# Patient Record
Sex: FEMALE | Race: WHITE
[De-identification: names, ages, dates, MRNs, and addresses within clinical notes are randomized per-mention and may not be internally consistent; named-entity substitution may affect disease eponyms.]

---

## 2021-11-04 ENCOUNTER — HOSPITAL ENCOUNTER (EMERGENCY)
Dept: HOSPITAL 49 - FER | Age: 51
Discharge: HOME | End: 2021-11-04
Payer: COMMERCIAL

## 2021-11-04 DIAGNOSIS — I10: ICD-10-CM

## 2021-11-04 DIAGNOSIS — R20.2: ICD-10-CM

## 2021-11-04 DIAGNOSIS — F17.210: ICD-10-CM

## 2021-11-04 DIAGNOSIS — R07.89: Primary | ICD-10-CM

## 2021-11-04 LAB
ALBUMIN SERPL-MCNC: 3.9 G/DL (ref 3.4–5)
ALKALINE PHOSHATASE: 61 U/L (ref 46–116)
ALT SERPL-CCNC: 21 U/L (ref 14–59)
AST: 16 U/L (ref 15–37)
BASOPHIL: 0.5 % (ref 0–2)
BILIRUBIN - TOTAL: 0.2 MG/DL (ref 0.2–1)
BUN SERPL-MCNC: 17 MG/DL (ref 7–18)
BUN/CREAT RATIO (CALC): 17.9 RATIO
CHLORIDE: 106 MMOL/L (ref 98–107)
CO2 (BICARBONATE): 25 MMOL/L (ref 21–32)
CREATININE: 0.95 MG/DL (ref 0.51–0.95)
EOSINOPHIL: 3.1 % (ref 0–5)
GLOBULIN (CALCULATION): 3.9 G/DL
GLUCOSE SERPL-MCNC: 80 MG/DL (ref 74–106)
HCT: 41.3 % (ref 37–47)
HGB BLD-MCNC: 13.4 G/DL (ref 12.5–16)
INR PPP: 1.07 (ref 0.9–1.2)
LYMPHOCYTE: 26.8 % (ref 15–48)
MCH RBC QN AUTO: 30.4 PG (ref 25–31)
MCHC RBC AUTO-ENTMCNC: 32.4 G/DL (ref 32–36)
MCV: 93.7 FL (ref 78–100)
MONOCYTE: 5 % (ref 0–12)
MPV: 9.7 FL (ref 6–9.5)
NEUTROPHIL: 64.5 % (ref 41–80)
NRBC: 0
PLT: 258 K/UL (ref 150–400)
POTASSIUM: 3.7 MMOL/L (ref 3.5–5.1)
PROTHROMBIN TIME: 13.3 SECONDS (ref 11.8–13.4)
PTT: 30.3 SECONDS (ref 24.4–34.7)
RBC MORPHOLOGY: NORMAL
RBC: 4.41 M/UL (ref 4.2–5.4)
RDW: 12.6 % (ref 11.5–14)
TOTAL PROTEIN: 7.8 G/DL (ref 6.4–8.2)
WBC: 8.1 K/UL (ref 4–10.5)

## 2022-01-19 PROCEDURE — U0004 COV-19 TEST NON-CDC HGH THRU: HCPCS | Performed by: FAMILY MEDICINE

## 2022-03-15 ENCOUNTER — HOSPITAL ENCOUNTER (INPATIENT)
Facility: HOSPITAL | Age: 52
LOS: 2 days | Discharge: HOME | End: 2022-03-18
Attending: EMERGENCY MEDICINE | Admitting: INTERNAL MEDICINE

## 2022-03-15 DIAGNOSIS — R41.82 ALTERED MENTAL STATUS, UNSPECIFIED ALTERED MENTAL STATUS TYPE: ICD-10-CM

## 2022-03-15 DIAGNOSIS — G25.71 DRUG-INDUCED AKATHISIA: Primary | ICD-10-CM

## 2022-03-15 DIAGNOSIS — E86.0 DEHYDRATION: ICD-10-CM

## 2022-03-15 DIAGNOSIS — N17.9 ACUTE RENAL FAILURE, UNSPECIFIED ACUTE RENAL FAILURE TYPE: ICD-10-CM

## 2022-03-15 DIAGNOSIS — F15.10 METHAMPHETAMINE ABUSE: ICD-10-CM

## 2022-03-15 DIAGNOSIS — M62.82 NON-TRAUMATIC RHABDOMYOLYSIS: ICD-10-CM

## 2022-03-15 LAB
ALBUMIN SERPL-MCNC: 4.9 G/DL (ref 3.5–5.2)
ALBUMIN/GLOB SERPL: 1.3 G/DL
ALP SERPL-CCNC: 92 U/L (ref 39–117)
ALT SERPL W P-5'-P-CCNC: 197 U/L (ref 1–33)
AMPHET+METHAMPHET UR QL: POSITIVE
ANION GAP SERPL CALCULATED.3IONS-SCNC: 21.6 MMOL/L (ref 5–15)
APAP SERPL-MCNC: <5 MCG/ML (ref 0–30)
AST SERPL-CCNC: 885 U/L (ref 1–32)
BARBITURATES UR QL SCN: NEGATIVE
BASOPHILS # BLD AUTO: 0.06 10*3/MM3 (ref 0–0.2)
BASOPHILS NFR BLD AUTO: 0.2 % (ref 0–1.5)
BENZODIAZ UR QL SCN: NEGATIVE
BILIRUB SERPL-MCNC: 0.9 MG/DL (ref 0–1.2)
BUN SERPL-MCNC: 49 MG/DL (ref 6–20)
BUN/CREAT SERPL: 14.5 (ref 7–25)
CALCIUM SPEC-SCNC: 10.4 MG/DL (ref 8.6–10.5)
CANNABINOIDS SERPL QL: NEGATIVE
CHLORIDE SERPL-SCNC: 94 MMOL/L (ref 98–107)
CK SERPL-CCNC: ABNORMAL U/L (ref 20–180)
CO2 SERPL-SCNC: 20.4 MMOL/L (ref 22–29)
COCAINE UR QL: NEGATIVE
CREAT SERPL-MCNC: 3.37 MG/DL (ref 0.57–1)
DEPRECATED RDW RBC AUTO: 42.4 FL (ref 37–54)
EGFRCR SERPLBLD CKD-EPI 2021: 16.1 ML/MIN/1.73
EOSINOPHIL # BLD AUTO: 0 10*3/MM3 (ref 0–0.4)
EOSINOPHIL NFR BLD AUTO: 0 % (ref 0.3–6.2)
ERYTHROCYTE [DISTWIDTH] IN BLOOD BY AUTOMATED COUNT: 13.2 % (ref 12.3–15.4)
ETHANOL BLD-MCNC: <10 MG/DL (ref 0–10)
ETHANOL UR QL: <0.01 %
GLOBULIN UR ELPH-MCNC: 3.8 GM/DL
GLUCOSE SERPL-MCNC: 120 MG/DL (ref 65–99)
HCT VFR BLD AUTO: 41.1 % (ref 34–46.6)
HGB BLD-MCNC: 14.1 G/DL (ref 12–15.9)
HOLD SPECIMEN: NORMAL
HOLD SPECIMEN: NORMAL
IMM GRANULOCYTES # BLD AUTO: 0.28 10*3/MM3 (ref 0–0.05)
IMM GRANULOCYTES NFR BLD AUTO: 0.8 % (ref 0–0.5)
LARGE PLATELETS: NORMAL
LYMPHOCYTES # BLD AUTO: 2.74 10*3/MM3 (ref 0.7–3.1)
LYMPHOCYTES NFR BLD AUTO: 7.4 % (ref 19.6–45.3)
MCH RBC QN AUTO: 30 PG (ref 26.6–33)
MCHC RBC AUTO-ENTMCNC: 34.3 G/DL (ref 31.5–35.7)
MCV RBC AUTO: 87.4 FL (ref 79–97)
METHADONE UR QL SCN: NEGATIVE
MONOCYTES # BLD AUTO: 2.37 10*3/MM3 (ref 0.1–0.9)
MONOCYTES NFR BLD AUTO: 6.4 % (ref 5–12)
NEUTROPHILS NFR BLD AUTO: 31.37 10*3/MM3 (ref 1.7–7)
NEUTROPHILS NFR BLD AUTO: 85.2 % (ref 42.7–76)
NRBC BLD AUTO-RTO: 0 /100 WBC (ref 0–0.2)
OPIATES UR QL: NEGATIVE
OXYCODONE UR QL SCN: NEGATIVE
PLATELET # BLD AUTO: 491 10*3/MM3 (ref 140–450)
PMV BLD AUTO: 9.7 FL (ref 6–12)
POTASSIUM SERPL-SCNC: 5.4 MMOL/L (ref 3.5–5.2)
PROT SERPL-MCNC: 8.7 G/DL (ref 6–8.5)
RBC # BLD AUTO: 4.7 10*6/MM3 (ref 3.77–5.28)
RBC MORPH BLD: NORMAL
SALICYLATES SERPL-MCNC: <0.3 MG/DL
SODIUM SERPL-SCNC: 136 MMOL/L (ref 136–145)
WBC MORPH BLD: NORMAL
WBC NRBC COR # BLD: 36.82 10*3/MM3 (ref 3.4–10.8)
WHOLE BLOOD HOLD SPECIMEN: NORMAL
WHOLE BLOOD HOLD SPECIMEN: NORMAL

## 2022-03-15 PROCEDURE — 80053 COMPREHEN METABOLIC PANEL: CPT | Performed by: EMERGENCY MEDICINE

## 2022-03-15 PROCEDURE — 99285 EMERGENCY DEPT VISIT HI MDM: CPT

## 2022-03-15 PROCEDURE — 87086 URINE CULTURE/COLONY COUNT: CPT | Performed by: PHYSICIAN ASSISTANT

## 2022-03-15 PROCEDURE — 80307 DRUG TEST PRSMV CHEM ANLYZR: CPT | Performed by: EMERGENCY MEDICINE

## 2022-03-15 PROCEDURE — 80143 DRUG ASSAY ACETAMINOPHEN: CPT | Performed by: EMERGENCY MEDICINE

## 2022-03-15 PROCEDURE — 25010000002 LORAZEPAM PER 2 MG: Performed by: EMERGENCY MEDICINE

## 2022-03-15 PROCEDURE — 85025 COMPLETE CBC W/AUTO DIFF WBC: CPT | Performed by: EMERGENCY MEDICINE

## 2022-03-15 PROCEDURE — P9612 CATHETERIZE FOR URINE SPEC: HCPCS

## 2022-03-15 PROCEDURE — 25010000002 DIPHENHYDRAMINE PER 50 MG: Performed by: EMERGENCY MEDICINE

## 2022-03-15 PROCEDURE — 82077 ASSAY SPEC XCP UR&BREATH IA: CPT | Performed by: EMERGENCY MEDICINE

## 2022-03-15 PROCEDURE — 80179 DRUG ASSAY SALICYLATE: CPT | Performed by: EMERGENCY MEDICINE

## 2022-03-15 PROCEDURE — 81001 URINALYSIS AUTO W/SCOPE: CPT | Performed by: INTERNAL MEDICINE

## 2022-03-15 PROCEDURE — 85007 BL SMEAR W/DIFF WBC COUNT: CPT | Performed by: EMERGENCY MEDICINE

## 2022-03-15 PROCEDURE — 82550 ASSAY OF CK (CPK): CPT | Performed by: EMERGENCY MEDICINE

## 2022-03-15 RX ORDER — DIPHENHYDRAMINE HYDROCHLORIDE 50 MG/ML
50 INJECTION INTRAMUSCULAR; INTRAVENOUS ONCE
Status: COMPLETED | OUTPATIENT
Start: 2022-03-15 | End: 2022-03-15

## 2022-03-15 RX ORDER — SODIUM CHLORIDE 0.9 % (FLUSH) 0.9 %
10 SYRINGE (ML) INJECTION AS NEEDED
Status: DISCONTINUED | OUTPATIENT
Start: 2022-03-15 | End: 2022-03-18 | Stop reason: HOSPADM

## 2022-03-15 RX ORDER — LORAZEPAM 2 MG/ML
1 INJECTION INTRAMUSCULAR ONCE
Status: COMPLETED | OUTPATIENT
Start: 2022-03-15 | End: 2022-03-15

## 2022-03-15 RX ORDER — CEFEPIME 1 G/50ML
2 INJECTION, SOLUTION INTRAVENOUS ONCE
Status: COMPLETED | OUTPATIENT
Start: 2022-03-15 | End: 2022-03-16

## 2022-03-15 RX ADMIN — SODIUM CHLORIDE 1000 ML: 9 INJECTION, SOLUTION INTRAVENOUS at 22:43

## 2022-03-15 RX ADMIN — DIPHENHYDRAMINE HYDROCHLORIDE 50 MG: 50 INJECTION, SOLUTION INTRAMUSCULAR; INTRAVENOUS at 22:43

## 2022-03-15 RX ADMIN — SODIUM CHLORIDE 1000 ML: 9 INJECTION, SOLUTION INTRAVENOUS at 22:42

## 2022-03-15 RX ADMIN — LORAZEPAM 1 MG: 2 INJECTION INTRAMUSCULAR; INTRAVENOUS at 22:43

## 2022-03-16 ENCOUNTER — APPOINTMENT (OUTPATIENT)
Dept: CT IMAGING | Facility: HOSPITAL | Age: 52
End: 2022-03-16

## 2022-03-16 ENCOUNTER — APPOINTMENT (OUTPATIENT)
Dept: GENERAL RADIOLOGY | Facility: HOSPITAL | Age: 52
End: 2022-03-16

## 2022-03-16 PROBLEM — G25.71 DRUG-INDUCED AKATHISIA: Status: ACTIVE | Noted: 2022-03-16

## 2022-03-16 LAB
ALBUMIN SERPL-MCNC: 3.7 G/DL (ref 3.5–5.2)
ALBUMIN/GLOB SERPL: 1.1 G/DL
ALP SERPL-CCNC: 67 U/L (ref 39–117)
ALT SERPL W P-5'-P-CCNC: 157 U/L (ref 1–33)
AMORPH URATE CRY URNS QL MICRO: ABNORMAL /HPF
ANION GAP SERPL CALCULATED.3IONS-SCNC: 14.6 MMOL/L (ref 5–15)
ARTERIAL PATENCY WRIST A: ABNORMAL
AST SERPL-CCNC: 634 U/L (ref 1–32)
BACTERIA UR QL AUTO: ABNORMAL /HPF
BASE EXCESS BLDA CALC-SCNC: -5.5 MMOL/L (ref -2–2)
BASOPHILS # BLD AUTO: 0.04 10*3/MM3 (ref 0–0.2)
BASOPHILS NFR BLD AUTO: 0.2 % (ref 0–1.5)
BDY SITE: ABNORMAL
BILIRUB SERPL-MCNC: 0.6 MG/DL (ref 0–1.2)
BILIRUB UR QL STRIP: ABNORMAL
BUN SERPL-MCNC: 47 MG/DL (ref 6–20)
BUN/CREAT SERPL: 21.1 (ref 7–25)
CA-I BLDA-SCNC: 1.08 MMOL/L (ref 1.13–1.32)
CALCIUM SPEC-SCNC: 9 MG/DL (ref 8.6–10.5)
CHLORIDE BLDA-SCNC: 105 MMOL/L (ref 98–106)
CHLORIDE SERPL-SCNC: 101 MMOL/L (ref 98–107)
CK SERPL-CCNC: ABNORMAL U/L (ref 20–180)
CLARITY UR: ABNORMAL
CO2 SERPL-SCNC: 20.4 MMOL/L (ref 22–29)
COD CRY URNS QL: ABNORMAL /HPF
COHGB MFR BLD: 0.3 % (ref 0–1.5)
COLOR UR: ABNORMAL
CREAT SERPL-MCNC: 2.23 MG/DL (ref 0.57–1)
D-LACTATE SERPL-SCNC: 0.8 MMOL/L (ref 0.5–2)
D-LACTATE SERPL-SCNC: 0.8 MMOL/L (ref 0.5–2)
DEPRECATED RDW RBC AUTO: 43.4 FL (ref 37–54)
EGFRCR SERPLBLD CKD-EPI 2021: 26.4 ML/MIN/1.73
EOSINOPHIL # BLD AUTO: 0 10*3/MM3 (ref 0–0.4)
EOSINOPHIL NFR BLD AUTO: 0 % (ref 0.3–6.2)
ERYTHROCYTE [DISTWIDTH] IN BLOOD BY AUTOMATED COUNT: 13.3 % (ref 12.3–15.4)
FHHB: 3.1 % (ref 0–5)
GAS FLOW AIRWAY: 3 LPM
GLOBULIN UR ELPH-MCNC: 3.5 GM/DL
GLUCOSE BLDA-MCNC: 146 MMOL/L (ref 65–99)
GLUCOSE BLDC GLUCOMTR-MCNC: 115 MG/DL (ref 70–99)
GLUCOSE BLDC GLUCOMTR-MCNC: 121 MG/DL (ref 70–99)
GLUCOSE BLDC GLUCOMTR-MCNC: 126 MG/DL (ref 70–99)
GLUCOSE SERPL-MCNC: 145 MG/DL (ref 65–99)
GLUCOSE UR STRIP-MCNC: NEGATIVE MG/DL
GRAN CASTS URNS QL MICRO: ABNORMAL /LPF
HAV IGM SERPL QL IA: NORMAL
HBV CORE IGM SERPL QL IA: NORMAL
HBV SURFACE AG SERPL QL IA: NORMAL
HCO3 BLDA-SCNC: 19.3 MMOL/L (ref 22–26)
HCT VFR BLD AUTO: 37.8 % (ref 34–46.6)
HCV AB SER DONR QL: NORMAL
HGB BLD-MCNC: 12.5 G/DL (ref 12–15.9)
HGB BLDA-MCNC: 13.2 G/DL (ref 11.7–14.6)
HGB UR QL STRIP.AUTO: ABNORMAL
HYALINE CASTS UR QL AUTO: ABNORMAL /LPF
IMM GRANULOCYTES # BLD AUTO: 0.17 10*3/MM3 (ref 0–0.05)
IMM GRANULOCYTES NFR BLD AUTO: 0.8 % (ref 0–0.5)
INHALED O2 CONCENTRATION: ABNORMAL %
KETONES UR QL STRIP: NEGATIVE
LACTATE BLDA-SCNC: 0.99 MMOL/L (ref 0.5–2)
LEUKOCYTE ESTERASE UR QL STRIP.AUTO: ABNORMAL
LYMPHOCYTES # BLD AUTO: 2 10*3/MM3 (ref 0.7–3.1)
LYMPHOCYTES NFR BLD AUTO: 8.9 % (ref 19.6–45.3)
MAGNESIUM SERPL-MCNC: 2.5 MG/DL (ref 1.6–2.6)
MCH RBC QN AUTO: 29.6 PG (ref 26.6–33)
MCHC RBC AUTO-ENTMCNC: 33.1 G/DL (ref 31.5–35.7)
MCV RBC AUTO: 89.4 FL (ref 79–97)
METHGB BLD QL: 0.1 % (ref 0–1.5)
MODALITY: ABNORMAL
MONOCYTES # BLD AUTO: 1.53 10*3/MM3 (ref 0.1–0.9)
MONOCYTES NFR BLD AUTO: 6.8 % (ref 5–12)
MUCOUS THREADS URNS QL MICRO: ABNORMAL /HPF
NEUTROPHILS NFR BLD AUTO: 18.64 10*3/MM3 (ref 1.7–7)
NEUTROPHILS NFR BLD AUTO: 83.3 % (ref 42.7–76)
NITRITE UR QL STRIP: NEGATIVE
NOTE: ABNORMAL
NRBC BLD AUTO-RTO: 0 /100 WBC (ref 0–0.2)
NT-PROBNP SERPL-MCNC: 668.5 PG/ML (ref 0–450)
OXYHGB MFR BLDV: 96.5 % (ref 94–99)
PCO2 BLDA: 35.5 MM HG (ref 35–45)
PH BLDA: 7.35 PH UNITS (ref 7.35–7.45)
PH UR STRIP.AUTO: <=5 [PH] (ref 5–8)
PHOSPHATE SERPL-MCNC: 5.4 MG/DL (ref 2.5–4.5)
PLATELET # BLD AUTO: 290 10*3/MM3 (ref 140–450)
PMV BLD AUTO: 9 FL (ref 6–12)
PO2 BLD: ABNORMAL MM[HG]
PO2 BLDA: 101.7 MM HG (ref 80–100)
POTASSIUM BLDA-SCNC: 3.8 MMOL/L (ref 3.5–5)
POTASSIUM SERPL-SCNC: 3.9 MMOL/L (ref 3.5–5.2)
PROCALCITONIN SERPL-MCNC: 0.5 NG/ML (ref 0–0.25)
PROT SERPL-MCNC: 7.2 G/DL (ref 6–8.5)
PROT UR QL STRIP: ABNORMAL
RBC # BLD AUTO: 4.23 10*6/MM3 (ref 3.77–5.28)
RBC # UR STRIP: ABNORMAL /HPF
REF LAB TEST METHOD: ABNORMAL
SAO2 % BLDCOA: 96.9 % (ref 95–99)
SODIUM BLDA-SCNC: 134.9 MMOL/L (ref 136–146)
SODIUM SERPL-SCNC: 136 MMOL/L (ref 136–145)
SP GR UR STRIP: >1.03 (ref 1–1.03)
SQUAMOUS #/AREA URNS HPF: ABNORMAL /HPF
TROPONIN T SERPL-MCNC: <0.01 NG/ML (ref 0–0.03)
UROBILINOGEN UR QL STRIP: ABNORMAL
WBC # UR STRIP: ABNORMAL /HPF
WBC NRBC COR # BLD: 22.38 10*3/MM3 (ref 3.4–10.8)

## 2022-03-16 PROCEDURE — 85025 COMPLETE CBC W/AUTO DIFF WBC: CPT | Performed by: INTERNAL MEDICINE

## 2022-03-16 PROCEDURE — 25010000002 CEFTRIAXONE PER 250 MG: Performed by: INTERNAL MEDICINE

## 2022-03-16 PROCEDURE — 25010000002 CEFEPIME PER 500 MG: Performed by: EMERGENCY MEDICINE

## 2022-03-16 PROCEDURE — 36600 WITHDRAWAL OF ARTERIAL BLOOD: CPT | Performed by: INTERNAL MEDICINE

## 2022-03-16 PROCEDURE — 25010000002 VANCOMYCIN 5 G RECONSTITUTED SOLUTION: Performed by: EMERGENCY MEDICINE

## 2022-03-16 PROCEDURE — 83605 ASSAY OF LACTIC ACID: CPT | Performed by: INTERNAL MEDICINE

## 2022-03-16 PROCEDURE — 71045 X-RAY EXAM CHEST 1 VIEW: CPT

## 2022-03-16 PROCEDURE — 70450 CT HEAD/BRAIN W/O DYE: CPT

## 2022-03-16 PROCEDURE — 84484 ASSAY OF TROPONIN QUANT: CPT | Performed by: INTERNAL MEDICINE

## 2022-03-16 PROCEDURE — 83880 ASSAY OF NATRIURETIC PEPTIDE: CPT | Performed by: INTERNAL MEDICINE

## 2022-03-16 PROCEDURE — 82375 ASSAY CARBOXYHB QUANT: CPT | Performed by: INTERNAL MEDICINE

## 2022-03-16 PROCEDURE — 36415 COLL VENOUS BLD VENIPUNCTURE: CPT | Performed by: INTERNAL MEDICINE

## 2022-03-16 PROCEDURE — 83735 ASSAY OF MAGNESIUM: CPT | Performed by: INTERNAL MEDICINE

## 2022-03-16 PROCEDURE — 80074 ACUTE HEPATITIS PANEL: CPT | Performed by: INTERNAL MEDICINE

## 2022-03-16 PROCEDURE — 99223 1ST HOSP IP/OBS HIGH 75: CPT | Performed by: INTERNAL MEDICINE

## 2022-03-16 PROCEDURE — 84145 PROCALCITONIN (PCT): CPT | Performed by: INTERNAL MEDICINE

## 2022-03-16 PROCEDURE — 83050 HGB METHEMOGLOBIN QUAN: CPT | Performed by: INTERNAL MEDICINE

## 2022-03-16 PROCEDURE — 82805 BLOOD GASES W/O2 SATURATION: CPT | Performed by: INTERNAL MEDICINE

## 2022-03-16 PROCEDURE — 82550 ASSAY OF CK (CPK): CPT | Performed by: INTERNAL MEDICINE

## 2022-03-16 PROCEDURE — 83605 ASSAY OF LACTIC ACID: CPT | Performed by: EMERGENCY MEDICINE

## 2022-03-16 PROCEDURE — 25010000002 LORAZEPAM PER 2 MG: Performed by: FAMILY MEDICINE

## 2022-03-16 PROCEDURE — 87040 BLOOD CULTURE FOR BACTERIA: CPT | Performed by: EMERGENCY MEDICINE

## 2022-03-16 PROCEDURE — 84100 ASSAY OF PHOSPHORUS: CPT | Performed by: INTERNAL MEDICINE

## 2022-03-16 PROCEDURE — 74176 CT ABD & PELVIS W/O CONTRAST: CPT

## 2022-03-16 PROCEDURE — 82962 GLUCOSE BLOOD TEST: CPT

## 2022-03-16 PROCEDURE — 80053 COMPREHEN METABOLIC PANEL: CPT | Performed by: INTERNAL MEDICINE

## 2022-03-16 RX ORDER — ONDANSETRON 2 MG/ML
4 INJECTION INTRAMUSCULAR; INTRAVENOUS EVERY 6 HOURS PRN
Status: DISCONTINUED | OUTPATIENT
Start: 2022-03-16 | End: 2022-03-18 | Stop reason: HOSPADM

## 2022-03-16 RX ORDER — CEFTRIAXONE SODIUM 1 G/50ML
1 INJECTION, SOLUTION INTRAVENOUS EVERY 24 HOURS
Status: DISCONTINUED | OUTPATIENT
Start: 2022-03-16 | End: 2022-03-18

## 2022-03-16 RX ORDER — SODIUM CHLORIDE 0.9 % (FLUSH) 0.9 %
10 SYRINGE (ML) INJECTION EVERY 12 HOURS SCHEDULED
Status: DISCONTINUED | OUTPATIENT
Start: 2022-03-16 | End: 2022-03-18 | Stop reason: HOSPADM

## 2022-03-16 RX ORDER — SODIUM CHLORIDE 0.9 % (FLUSH) 0.9 %
10 SYRINGE (ML) INJECTION AS NEEDED
Status: DISCONTINUED | OUTPATIENT
Start: 2022-03-16 | End: 2022-03-16

## 2022-03-16 RX ORDER — NICOTINE 21 MG/24HR
1 PATCH, TRANSDERMAL 24 HOURS TRANSDERMAL
Status: DISCONTINUED | OUTPATIENT
Start: 2022-03-16 | End: 2022-03-18 | Stop reason: HOSPADM

## 2022-03-16 RX ORDER — HYDROXYZINE PAMOATE 25 MG/1
25 CAPSULE ORAL EVERY 4 HOURS PRN
Status: DISCONTINUED | OUTPATIENT
Start: 2022-03-16 | End: 2022-03-18 | Stop reason: HOSPADM

## 2022-03-16 RX ORDER — ONDANSETRON 4 MG/1
4 TABLET, FILM COATED ORAL EVERY 6 HOURS PRN
Status: DISCONTINUED | OUTPATIENT
Start: 2022-03-16 | End: 2022-03-18 | Stop reason: HOSPADM

## 2022-03-16 RX ORDER — TRAZODONE HYDROCHLORIDE 50 MG/1
50 TABLET ORAL NIGHTLY
Status: DISCONTINUED | OUTPATIENT
Start: 2022-03-16 | End: 2022-03-18 | Stop reason: HOSPADM

## 2022-03-16 RX ORDER — LORAZEPAM 0.5 MG/1
1 TABLET ORAL
Status: DISCONTINUED | OUTPATIENT
Start: 2022-03-16 | End: 2022-03-16

## 2022-03-16 RX ORDER — LORAZEPAM 2 MG/ML
1 INJECTION INTRAMUSCULAR
Status: DISCONTINUED | OUTPATIENT
Start: 2022-03-16 | End: 2022-03-18 | Stop reason: HOSPADM

## 2022-03-16 RX ORDER — VENLAFAXINE HYDROCHLORIDE 150 MG/1
150 CAPSULE, EXTENDED RELEASE ORAL DAILY
Status: DISCONTINUED | OUTPATIENT
Start: 2022-03-16 | End: 2022-03-16

## 2022-03-16 RX ORDER — NITROGLYCERIN 0.4 MG/1
0.4 TABLET SUBLINGUAL
Status: DISCONTINUED | OUTPATIENT
Start: 2022-03-16 | End: 2022-03-17

## 2022-03-16 RX ORDER — LISINOPRIL 5 MG/1
5 TABLET ORAL DAILY
Status: DISCONTINUED | OUTPATIENT
Start: 2022-03-16 | End: 2022-03-16

## 2022-03-16 RX ORDER — FAMOTIDINE 10 MG/ML
20 INJECTION, SOLUTION INTRAVENOUS DAILY
Status: DISCONTINUED | OUTPATIENT
Start: 2022-03-16 | End: 2022-03-18

## 2022-03-16 RX ADMIN — SODIUM BICARBONATE 150 MEQ: 84 INJECTION, SOLUTION INTRAVENOUS at 13:04

## 2022-03-16 RX ADMIN — LORAZEPAM 1 MG: 2 INJECTION INTRAMUSCULAR; INTRAVENOUS at 11:05

## 2022-03-16 RX ADMIN — TRAZODONE HYDROCHLORIDE 50 MG: 50 TABLET ORAL at 20:13

## 2022-03-16 RX ADMIN — SODIUM BICARBONATE 150 MEQ: 84 INJECTION, SOLUTION INTRAVENOUS at 02:54

## 2022-03-16 RX ADMIN — LORAZEPAM 1 MG: 2 INJECTION INTRAMUSCULAR; INTRAVENOUS at 08:01

## 2022-03-16 RX ADMIN — SODIUM BICARBONATE 150 MEQ: 84 INJECTION, SOLUTION INTRAVENOUS at 23:32

## 2022-03-16 RX ADMIN — CEFEPIME 2 G: 1 INJECTION, SOLUTION INTRAVENOUS at 00:24

## 2022-03-16 RX ADMIN — VANCOMYCIN HYDROCHLORIDE 1250 MG: 5 INJECTION, POWDER, LYOPHILIZED, FOR SOLUTION INTRAVENOUS at 01:23

## 2022-03-16 RX ADMIN — LORAZEPAM 1 MG: 2 INJECTION INTRAMUSCULAR; INTRAVENOUS at 23:32

## 2022-03-16 RX ADMIN — NICOTINE 1 PATCH: 21 PATCH, EXTENDED RELEASE TRANSDERMAL at 13:04

## 2022-03-16 RX ADMIN — LORAZEPAM 1 MG: 2 INJECTION INTRAMUSCULAR; INTRAVENOUS at 02:54

## 2022-03-16 RX ADMIN — CEFTRIAXONE SODIUM 1 G: 1 INJECTION, SOLUTION INTRAVENOUS at 13:04

## 2022-03-16 RX ADMIN — VENLAFAXINE HYDROCHLORIDE 150 MG: 150 CAPSULE, EXTENDED RELEASE ORAL at 08:01

## 2022-03-16 RX ADMIN — FAMOTIDINE 20 MG: 10 INJECTION INTRAVENOUS at 13:03

## 2022-03-16 NOTE — PLAN OF CARE
Problem: Adult Inpatient Plan of Care  Goal: Plan of Care Review  Outcome: Ongoing, Progressing  Flowsheets  Taken 3/16/2022 1808 by Isatu Alfaro, RN  Outcome Evaluation: Patient slept most of day. Jerking movements still consistant with every awakening, but less agressive than beginning of shift. No complaints noted.  Taken 3/16/2022 0607 by Rebecca King, RN  Plan of Care Reviewed With: patient   Goal Outcome Evaluation:              Outcome Evaluation: Patient slept most of day. Jerking movements still consistant with every awakening, but less agressive than beginning of shift. No complaints noted.

## 2022-03-16 NOTE — H&P
[facility]   HISTORY AND PHYSICAL    Patient Name: Vega Correa  : 1970  MRN: [id number]  Primary Care Physician:  Barrett Bradley MD  Date of admission: 3/15/2022    Subjective   Subjective     Chief Complaint:   Agitation    History of Present Illness  Note: By the time I saw the patient she was completely snowed by treatment for her extreme agitation.  As such, all reports are excerpted from the ED reports.    51 y.o. year old female  with anxiety/depression, HTN, positive Covid result 2022, and history of substance abuse presents her significant other gave her 5 Ativan tablets earlier this weekend and also 4 ropinirole tablets as she has history of restless leg.     For the past 2 or 3 days she has had inability to sit still or hold still, and has had uncontrollable body movements all over.     She denies using any meth or other illicit drugs, but apparently has history of substance abuse previously.     She did strike her head on the table earlier in the setting of these uncontrollable body movements.     She denies withdrawing from any medication or substances.     No fevers or cough or congestion or signs of COVID-19 reported.    In the ED:  Afebrile, initially tachycardic, tachypneic, and hypertensive but that all resolved with management of her agitation.    Notable labs and studies:    CK > 22,000    CMP:  -Potassium 5.4  -CO2 20.4  -Anion gap 21.6  -BUN/creatinine: 49/3.37  -  -    Lactic acid: 0.8    CBC:  -WBC 36.82  -Hgb 14.1  -Platelets 491    Urinalysis:  -Many findings but equivocal for infection    CXR:  No acute process     CT head:  No acute brain abnormality is seen. Specifically, no acute intracranial hemorrhage,   no acute infarct, no intracranial mass lesion, and no acute intracranial mass effect are   appreciated.     Received:  -Several sedating agents  -Significant IVF  -Vancomycin and cefepime empirically before any infectious studies could be  gathered      Review of Systems   Impossible to do review of systems as patient sedated  Personal History     Past Medical History:   Diagnosis Date   • Anxiety    • Depression    • Drug abuse and dependence (HCC)    • Hypertension        Past Surgical History:   Procedure Laterality Date   • CHOLECYSTECTOMY     • TONSILLECTOMY     • TUBAL ABDOMINAL LIGATION         Family History: family history is not on file. Otherwise pertinent FHx was reviewed and not pertinent to current issue.    Social History:  reports that she has been smoking cigarettes. She has been smoking about 0.50 packs per day. She has never used smokeless tobacco. She reports previous alcohol use. She reports current drug use. Drug: Methamphetamines.    Home Medications:  LORazepam, brompheniramine-pseudoephedrine-DM, ibuprofen, lisinopril, and venlafaxine XR    Allergies:  No Known Allergies    Objective    Objective     Vitals:   Temp:  [98.2 °F (36.8 °C)] 98.2 °F (36.8 °C)  Heart Rate:  [] 85  Resp:  [35] 35  BP: (131-156)/() 131/85    Physical Exam  General: Sedated.  Sleeping comfortably  HEENT: Multiple abrasions and bruises without any sign of significant trauma.  Mucous membranes dry.  Heart: Regular rate and rhythm  Lungs: Clear to auscultation bilaterally without wheezes or crackles, no respiratory distress  Abdomen: Soft, nontender, nondistended, no rebound or guarding  Skin: Multiple abrasions, excoriations, bruises but no signs of significant wounds or signs of significant trauma  Musculoskeletal: No edema  Neurologic: Sedated  Psychiatric: Sedated, resting calmly        Result Review    Result Review:  I have personally reviewed the results from the time of this admission to 3/16/2022 03:53 EDT and agree with these findings:  [x]  Laboratory  []  Microbiology  [x]  Radiology  []  EKG/Telemetry   []  Cardiology/Vascular   []  Pathology  []  Old records  []  Other:  Most notable findings include: Profoundly elevated CK,  creatinine, and WBCs with U tox positive for meth.    Assessment/Plan   Assessment / Plan     Brief Patient Summary:  51 y.o. year old female  with anxiety/depression, HTN, positive Covid result 1/19/2022, and history of substance abuse presents in profound agitation and found to be positive for meth; subsequently sedated and resting comfortably.    Active Hospital Problems:  Active Hospital Problems    Diagnosis    • Drug-induced akathisia      Plan:   Agitation secondary to substance abuse  Rhabdomyolysis  LUZ MARINA  Leukocytosis  -All seems secondary to methamphetamine overdose and subsequent hypovolemia  -Receiving liberal IV fluid resuscitation  -Sedated for safety and management of agitation/hypertension/tachycardia  [] Follow-up repeat labs    Leukocytosis  -Assume secondary to stress of extreme agitation  -Urinalysis equivocal at best; symptomatology impossible to assess  -CXR: Unremarkable  -Received vancomycin and cefepime in the ED empirically   [] Assess need for ongoing antibiotics; none are ordered    Chronic issues:  -Depression: Effexor ordered for when she can take p.o.  -Hypertension: Held lisinopril and LUZ MARINA    DVT prophylaxis:  Mechanical DVT prophylaxis orders are present.    CODE STATUS:    Code Status (Patient has no pulse and is not breathing): CPR (Attempt to Resuscitate)  Medical Interventions (Patient has pulse or is breathing): Full Support    Admission Status:  I believe this patient meets inpatient status.    Rubio Sales MD

## 2022-03-16 NOTE — PLAN OF CARE
Goal Outcome Evaluation:  Plan of Care Reviewed With: patient        Progress: no change     Patient admitted from ER, with involuntary jerking. Patient lethargic, given doses of benadryl and ativan while in ER. Patient started on Na Bicarb in D5W at 125ml/hr. Patient WBC 36.82, potassium 5.4, creatine kinase 22,000, and creatinine 3.37. Patient required to be straight cath due to inability to urinate on own, patient's urine dark tea colored, drained 500ml from patient bladder. Patient also presented with all over scattered bruising and scabs, patient stats that she fell and hit her head and knee at home. Patient on 3L nasal cannula. VSS. No new issues or complaints noted per nursing at this time. Will continue to monitor. Rebecca King RN

## 2022-03-16 NOTE — PLAN OF CARE
Nutrition Note    Patient followed by RD for MST score of 2 related to unsure wt loss. Patient with 10# (6%) weight loss x2 months which is not significant. Patient is at low risk per nutrition risk screening. No acute nutrition concerns or nutrition intervention at this time. RD will continue to follow and monitor per protocol.     Nuha Bhardwaj, ROXANA 09:44 EDT

## 2022-03-16 NOTE — ED PROVIDER NOTES
Time: 2240  Arrived by: EMS  Chief Complaint: Abnormal behavior and uncontrollable body movements, possible overdose  History provided by: Patient, EMS    History of Present Illness:  Patient is a 51 y.o. year old female that presents to the emergency department with previous history of substance abuse who states her significant other gave her 5 Ativan tablets earlier this weekend and also 4 ropinirole tablets as she has history of restless leg.    For the past 2 or 3 days she has had inability to sit still or hold still, and has had uncontrollable body movements all over.    She denies using any meth or other illicit drugs, but apparently has history of substance abuse previously.    She did strike her head on the table earlier in the setting of these uncontrollable body movements.    She denies withdrawing from any medication or substances.    No fevers or cough or congestion or signs of COVID-19 reported.    Similar Symptoms Previously: No  Recently seen: Unknown      Patient Care Team  Primary Care Provider: Dr. Jakob Bradley    Past Medical History:   Anxiety and depression, hypertension, substance abuse history    Social History     Socioeconomic History   • Marital status:    Tobacco Use   • Smoking status: Heavy Tobacco Smoker     Packs/day: 0.50     Types: Cigarettes   • Smokeless tobacco: Never Used   Substance and Sexual Activity   • Alcohol use: Not Currently   • Drug use: Yes     Types: Methamphetamines   • Sexual activity: Defer         No Known Allergies  Past Medical History:   Diagnosis Date   • Anxiety    • Depression    • Drug abuse and dependence (HCC)    • Hypertension      Past Surgical History:   Procedure Laterality Date   • CHOLECYSTECTOMY     • TONSILLECTOMY     • TUBAL ABDOMINAL LIGATION       History reviewed. No pertinent family history.    Home Medications:  Prior to Admission medications    Medication Sig Start Date End Date Taking? Authorizing Provider  "  brompheniramine-pseudoephedrine-DM 30-2-10 MG/5ML syrup Take 10 mL by mouth 4 (Four) Times a Day As Needed for Cough or Allergies. 1/19/22   Ansley Pablo MD   ibuprofen (ADVIL,MOTRIN) 600 MG tablet Take 600 mg by mouth Every 6 (Six) Hours As Needed for Mild Pain .    Emergency, Nurse Khurram RN   lisinopril (PRINIVIL,ZESTRIL) 5 MG tablet Take 5 mg by mouth Daily.    Emergency, Nurse Khurram RN   LORazepam (ATIVAN) 0.5 MG tablet Take 0.5 mg by mouth Every 8 (Eight) Hours As Needed for Anxiety.    Emergency, Nurse Khurram, RN   venlafaxine XR (EFFEXOR-XR) 150 MG 24 hr capsule Take 150 mg by mouth Daily.    Emergency, Nurse Epic, RN        Record Review:  I have reviewed the patient's records in [facility].     Review of Systems:  Review of Systems   I performed a 10 point review of systems which was all negative, except for the positives found in the HPI above.  Physical Exam:  /85   Pulse 85   Temp 98.2 °F (36.8 °C) (Oral)   Resp (!) 35   Ht 154.9 cm (61\")   Wt 67.6 kg (149 lb 0.5 oz)   SpO2 99%   BMI 28.16 kg/m²     Physical Exam   General: Awake alert, but thrashing all over with uncontrolled body movements and head turning back-and-forth.  Able to answer questions appropriately.    HEENT: Head normocephalic atraumatic, eyes PERRLA EOMI, nose normal, oropharynx normal, but edentulous.  Mucous membranes look severely dehydrated    Neck: Supple full range of motion, no meningismus, no lymphadenopathy    Heart: Tachycardic with a regular rhythm, no murmurs or rubs, 2+ radial pulses bilaterally    Lungs: Clear to auscultation bilaterally without wheezes or crackles, no respiratory distress    Abdomen: Soft, nontender, nondistended, no rebound or guarding    Skin: Warm, dry, no rash    Musculoskeletal: Normal range of motion, no lower extremity edema    Neurologic: Oriented x3, no motor deficits no sensory deficits, possible akathisia or hyperkinetic movements noted, unable to sit still    Psychiatric: Mood " appears anxious, no psychosis         Medications in the Emergency Department:  Medications   sodium chloride 0.9 % flush 10 mL (has no administration in time range)   sodium chloride 0.9 % flush 10 mL (has no administration in time range)   nitroglycerin (NITROSTAT) SL tablet 0.4 mg (has no administration in time range)   sodium chloride 0.9 % flush 10 mL (has no administration in time range)   ondansetron (ZOFRAN) tablet 4 mg (has no administration in time range)     Or   ondansetron (ZOFRAN) injection 4 mg (has no administration in time range)   sodium bicarbonate 150 mEq/1000 mL D5W infusion (has no administration in time range)   lisinopril (PRINIVIL,ZESTRIL) tablet 5 mg (has no administration in time range)   venlafaxine XR (EFFEXOR-XR) 24 hr capsule 150 mg (has no administration in time range)   LORazepam (ATIVAN) injection 1 mg (has no administration in time range)   LORazepam (ATIVAN) injection 1 mg (1 mg Intravenous Given 3/15/22 2243)   diphenhydrAMINE (BENADRYL) injection 50 mg (50 mg Intravenous Given 3/15/22 2243)   sodium chloride 0.9 % bolus 1,000 mL (0 mL Intravenous Stopped 3/16/22 0006)   sodium chloride 0.9 % bolus 1,000 mL (0 mL Intravenous Stopped 3/16/22 0024)   cefepime (MAXIPIME) IVPB 2 g (premix) in D5 (0 g Intravenous Stopped 3/16/22 0121)   vancomycin 1250 mg/250 mL 0.9% NS IVPB ([facility]) (1,250 mg Intravenous New Bag 3/16/22 0123)        Labs  Lab Results (last 24 hours)     Procedure Component Value Units Date/Time    CK [[id number]]  (Abnormal) Collected: 03/15/22 2245    Specimen: Blood Updated: 03/15/22 2337     Creatine Kinase >22,000 U/L     CBC & Differential [[id number]]  (Abnormal) Collected: 03/15/22 2245    Specimen: Blood Updated: 03/15/22 2318    Narrative:      The following orders were created for panel order CBC & Differential.  Procedure                               Abnormality         Status                     ---------                               -----------          ------                     CBC Auto Differential[[id number]]        Abnormal            Final result               Scan Slide[[id number]]                                       Final result                 Please view results for these tests on the individual orders.    Comprehensive Metabolic Panel [[id number]]  (Abnormal) Collected: 03/15/22 2245    Specimen: Blood Updated: 03/15/22 2323     Glucose 120 mg/dL      BUN 49 mg/dL      Creatinine 3.37 mg/dL      Sodium 136 mmol/L      Potassium 5.4 mmol/L      Chloride 94 mmol/L      CO2 20.4 mmol/L      Calcium 10.4 mg/dL      Total Protein 8.7 g/dL      Albumin 4.90 g/dL      ALT (SGPT) 197 U/L      AST (SGOT) 885 U/L      Alkaline Phosphatase 92 U/L      Total Bilirubin 0.9 mg/dL      Globulin 3.8 gm/dL      A/G Ratio 1.3 g/dL      BUN/Creatinine Ratio 14.5     Anion Gap 21.6 mmol/L      eGFR 16.1 mL/min/1.73      Comment: National Kidney Foundation and American Society of Nephrology (ASN) Task Force recommended calculation based on the Chronic Kidney Disease Epidemiology Collaboration (CKD-EPI) equation refit without adjustment for race.       Narrative:      GFR Normal >60  Chronic Kidney Disease <60  Kidney Failure <15      Acetaminophen Level [[id number]]  (Normal) Collected: 03/15/22 2245    Specimen: Blood Updated: 03/15/22 2311     Acetaminophen <5.0 mcg/mL     Ethanol [[id number]] Collected: 03/15/22 2245    Specimen: Blood Updated: 03/15/22 2311     Ethanol <10 mg/dL      Ethanol % <0.010 %     Narrative:      Ethanol (Plasma)  <10 Essentially Negative    Toxic Concentrations           mg/dL    Flushing, slowing of reflexes    Impaired visual activity         Depression of CNS              >100  Possible Coma                  >300       Salicylate Level [[id number]]  (Normal) Collected: 03/15/22 2245    Specimen: Blood Updated: 03/15/22 2311     Salicylate <0.3 mg/dL     CBC Auto Differential [[id number]]  (Abnormal) Collected: 03/15/22 2245     Specimen: Blood Updated: 03/15/22 2318     WBC 36.82 10*3/mm3      RBC 4.70 10*6/mm3      Hemoglobin 14.1 g/dL      Hematocrit 41.1 %      MCV 87.4 fL      MCH 30.0 pg      MCHC 34.3 g/dL      RDW 13.2 %      RDW-SD 42.4 fl      MPV 9.7 fL      Platelets 491 10*3/mm3      Neutrophil % 85.2 %      Lymphocyte % 7.4 %      Monocyte % 6.4 %      Eosinophil % 0.0 %      Basophil % 0.2 %      Immature Grans % 0.8 %      Neutrophils, Absolute 31.37 10*3/mm3      Lymphocytes, Absolute 2.74 10*3/mm3      Monocytes, Absolute 2.37 10*3/mm3      Eosinophils, Absolute 0.00 10*3/mm3      Basophils, Absolute 0.06 10*3/mm3      Immature Grans, Absolute 0.28 10*3/mm3      nRBC 0.0 /100 WBC     Scan Slide [[id number]] Collected: 03/15/22 2245    Specimen: Blood Updated: 03/15/22 2318     RBC Morphology Normal     WBC Morphology Normal     Large Platelets Slight/1+    Urine Drug Screen - Urine, Catheter [[id number]]  (Abnormal) Collected: 03/15/22 2256    Specimen: Urine, Catheter Updated: 03/15/22 2337     Amphet/Methamphet, Screen Positive     Barbiturates Screen, Urine Negative     Benzodiazepine Screen, Urine Negative     Cocaine Screen, Urine Negative     Opiate Screen Negative     THC, Screen, Urine Negative     Methadone Screen, Urine Negative     Oxycodone Screen, Urine Negative    Narrative:      Negative Thresholds Per Drugs Screened:    Amphetamines                 500 ng/ml  Barbiturates                 200 ng/ml  Benzodiazepines              100 ng/ml  Cocaine                      300 ng/ml  Methadone                    300 ng/ml  Opiates                      300 ng/ml  Oxycodone                    100 ng/ml  THC                           50 ng/ml    The Normal Value for all drugs tested is negative. This report includes final unconfirmed screening results to be used for medical treatment purposes only. Unconfirmed results must not be used for non-medical purposes such as employment or legal testing. Clinical consideration  should be applied to any drug of abuse test, particularly when unconfirmed results are used.            Lactic Acid, Plasma [[id number]]  (Normal) Collected: 03/16/22 0004    Specimen: Blood Updated: 03/16/22 0028     Lactate 0.8 mmol/L     Blood Culture - Blood, Arm, Left [[id number]] Collected: 03/16/22 0004    Specimen: Blood from Arm, Left Updated: 03/16/22 0010    Blood Culture - Blood, Arm, Right [[id number]] Collected: 03/16/22 0004    Specimen: Blood from Arm, Right Updated: 03/16/22 0010           Imaging:  CT Head Without Contrast    Result Date: 3/16/2022  PROCEDURE: CT HEAD WO CONTRAST  COMPARISON: None.  INDICATIONS: Mental status change; alcohol/drug use.  Altered mental status (AMS).  PROTOCOL:   Standard imaging protocol performed    RADIATION:   DLP: 1,017.2 mGy*cm   MA and/or KV were/was adjusted to minimize radiation dose.    TECHNIQUE: After obtaining the patient's consent, 130 CT images were obtained without non-ionic intravenous contrast material.  DISCUSSION:   A routine nonenhanced head CT was performed.  No acute brain abnormality is identified.  No acute intracranial hemorrhage.  No acute infarct is seen.  No acute skull fracture.  No midline shift or acute intracranial mass effect is seen.  The ventricular size and configuration are within normal limits.  No acute findings are seen with regard to the imaged orbits, paranasal sinuses, or middle ear clefts.  There is an incidental calcified 1.6 cm right occipital pilar cyst, as seen on image 33 of series 201 and 202; it is likely of doubtful clinical significance.  A few tiny incidental benign exostoses are seen as in the right middle cranial fossa, on image 16 of series 202, and along the superior squamosal right temporal bone, as seen on image 31 of series 202.  These findings are of doubtful clinical significance.       No acute brain abnormality is seen. Specifically, no acute intracranial hemorrhage, no acute infarct, no intracranial mass  lesion, and no acute intracranial mass effect are appreciated.    EVANGELINA ROSAS JR, MD       Electronically Signed and Approved By: EVANGELINA ROSAS JR, MD on 3/16/2022 at 1:50                Procedures:  Procedures    Progress                            Medical Decision Making:  MDM   For my differential diagnosis in this patient with altered mental status, I considered low blood sugar, head injury, alcohol intoxication, substance abuse, toxic or metabolic encephalopathy, less likely seizure.              This patient is a 51-year-old female with history of substance abuse previously and restless leg syndrome, who took possibly too much ropinirole tablets earlier this weekend, now having involuntary movements of all extremities and body, essentially unable to sit still.    She denies any substance abuse other than taking 5 Ativan tablets over the weekend and 4 of these ropinirole tablets.    We will attempt to get a urine drug screen.    She is tachycardic and does appear dehydrated so I am giving her IV fluids and checking labs along with serum creatine kinase measurement for rhabdo, given her prolonged period of physical activity from these hyperkinetic body movements.    She is awake and alert and can actually answer questions fairly appropriately, and denies any new medications or withdrawing off any recently stopped medications.    I am hydrating her aggressively with IV fluids and giving her some IV Benadryl in the case of dystonic reaction or akathisia, and also a dose of IV Ativan to calm her down.      Her lab work shows a significantly elevated white blood cell count, which could be acute stress reaction as opposed to just infection, given this prolonged involuntary movement disorder.    We are continuing to aggressively hydrate her with IV fluids and give some IV broad-spectrum antibiotics initially in the ED to cover for any possible sepsis, and I will send off lactic acid and blood cultures.    In  addition, it looks like she has likely some acute renal failure, and I consider rhabdomyolysis fairly likely given her continued movement.    On reassessment, she looks much more calm and stopped moving so much after IV Benadryl and Ativan here.      It looks like she did test positive for methamphetamines which would explain her thrashing around earlier.    I do note that she is in rhabdomyolysis with associated acute renal failure and we are continuing fluids and will admit her to the hospital.      Final diagnoses:   Drug-induced akathisia   Dehydration   Acute renal failure, unspecified acute renal failure type (HCC)   Altered mental status, unspecified altered mental status type   Non-traumatic rhabdomyolysis   Methamphetamine abuse (HCC)        Disposition:  ED Disposition     ED Disposition   Decision to Admit    Condition   --    Comment   Level of Care: Telemetry [5]   Diagnosis: Drug-induced akathisia [178322]   Admitting Physician: RENAN BEAVERS [[id number]]   Attending Physician: RENAN BEAVERS [[id number]]   Certification: I Certify That Inpatient Hospital Services Are Medically Necessary For Greater Than 2 Midnights                      Terence Kruse MD  03/16/22 0247

## 2022-03-17 ENCOUNTER — APPOINTMENT (OUTPATIENT)
Dept: GENERAL RADIOLOGY | Facility: HOSPITAL | Age: 52
End: 2022-03-17

## 2022-03-17 LAB
ALBUMIN SERPL-MCNC: 3 G/DL (ref 3.5–5.2)
ALBUMIN/GLOB SERPL: 1 G/DL
ALP SERPL-CCNC: 58 U/L (ref 39–117)
ALT SERPL W P-5'-P-CCNC: 125 U/L (ref 1–33)
ANION GAP SERPL CALCULATED.3IONS-SCNC: 6.7 MMOL/L (ref 5–15)
AST SERPL-CCNC: 319 U/L (ref 1–32)
BACTERIA SPEC AEROBE CULT: NO GROWTH
BASOPHILS # BLD AUTO: 0.01 10*3/MM3 (ref 0–0.2)
BASOPHILS NFR BLD AUTO: 0.1 % (ref 0–1.5)
BILIRUB SERPL-MCNC: 0.5 MG/DL (ref 0–1.2)
BUN SERPL-MCNC: 20 MG/DL (ref 6–20)
BUN/CREAT SERPL: 23.8 (ref 7–25)
CALCIUM SPEC-SCNC: 8.3 MG/DL (ref 8.6–10.5)
CHLORIDE SERPL-SCNC: 93 MMOL/L (ref 98–107)
CK SERPL-CCNC: 8363 U/L (ref 20–180)
CO2 SERPL-SCNC: 34.3 MMOL/L (ref 22–29)
CREAT SERPL-MCNC: 0.84 MG/DL (ref 0.57–1)
DEPRECATED RDW RBC AUTO: 42.2 FL (ref 37–54)
EGFRCR SERPLBLD CKD-EPI 2021: 85.3 ML/MIN/1.73
EOSINOPHIL # BLD AUTO: 0.06 10*3/MM3 (ref 0–0.4)
EOSINOPHIL NFR BLD AUTO: 0.4 % (ref 0.3–6.2)
ERYTHROCYTE [DISTWIDTH] IN BLOOD BY AUTOMATED COUNT: 13.4 % (ref 12.3–15.4)
GLOBULIN UR ELPH-MCNC: 3.1 GM/DL
GLUCOSE BLDC GLUCOMTR-MCNC: 111 MG/DL (ref 70–99)
GLUCOSE BLDC GLUCOMTR-MCNC: 154 MG/DL (ref 70–99)
GLUCOSE SERPL-MCNC: 128 MG/DL (ref 65–99)
HCT VFR BLD AUTO: 31.8 % (ref 34–46.6)
HGB BLD-MCNC: 10.9 G/DL (ref 12–15.9)
IMM GRANULOCYTES # BLD AUTO: 0.05 10*3/MM3 (ref 0–0.05)
IMM GRANULOCYTES NFR BLD AUTO: 0.4 % (ref 0–0.5)
LYMPHOCYTES # BLD AUTO: 1.36 10*3/MM3 (ref 0.7–3.1)
LYMPHOCYTES NFR BLD AUTO: 9.6 % (ref 19.6–45.3)
MCH RBC QN AUTO: 29.9 PG (ref 26.6–33)
MCHC RBC AUTO-ENTMCNC: 34.3 G/DL (ref 31.5–35.7)
MCV RBC AUTO: 87.1 FL (ref 79–97)
MONOCYTES # BLD AUTO: 1.03 10*3/MM3 (ref 0.1–0.9)
MONOCYTES NFR BLD AUTO: 7.3 % (ref 5–12)
NEUTROPHILS NFR BLD AUTO: 11.59 10*3/MM3 (ref 1.7–7)
NEUTROPHILS NFR BLD AUTO: 82.2 % (ref 42.7–76)
NRBC BLD AUTO-RTO: 0 /100 WBC (ref 0–0.2)
PLATELET # BLD AUTO: 217 10*3/MM3 (ref 140–450)
PMV BLD AUTO: 9.4 FL (ref 6–12)
POTASSIUM SERPL-SCNC: 3 MMOL/L (ref 3.5–5.2)
PROT SERPL-MCNC: 6.1 G/DL (ref 6–8.5)
QT INTERVAL: 406 MS
RBC # BLD AUTO: 3.65 10*6/MM3 (ref 3.77–5.28)
SODIUM SERPL-SCNC: 134 MMOL/L (ref 136–145)
WBC NRBC COR # BLD: 14.1 10*3/MM3 (ref 3.4–10.8)

## 2022-03-17 PROCEDURE — 80053 COMPREHEN METABOLIC PANEL: CPT | Performed by: INTERNAL MEDICINE

## 2022-03-17 PROCEDURE — 71100 X-RAY EXAM RIBS UNI 2 VIEWS: CPT

## 2022-03-17 PROCEDURE — 82962 GLUCOSE BLOOD TEST: CPT

## 2022-03-17 PROCEDURE — 25010000002 SODIUM CHLORIDE 0.9 % WITH KCL 20 MEQ 20-0.9 MEQ/L-% SOLUTION: Performed by: INTERNAL MEDICINE

## 2022-03-17 PROCEDURE — 82550 ASSAY OF CK (CPK): CPT | Performed by: INTERNAL MEDICINE

## 2022-03-17 PROCEDURE — 99233 SBSQ HOSP IP/OBS HIGH 50: CPT | Performed by: INTERNAL MEDICINE

## 2022-03-17 PROCEDURE — 93005 ELECTROCARDIOGRAM TRACING: CPT | Performed by: INTERNAL MEDICINE

## 2022-03-17 PROCEDURE — 85025 COMPLETE CBC W/AUTO DIFF WBC: CPT | Performed by: INTERNAL MEDICINE

## 2022-03-17 PROCEDURE — 25010000002 CEFTRIAXONE PER 250 MG: Performed by: INTERNAL MEDICINE

## 2022-03-17 RX ORDER — SODIUM CHLORIDE AND POTASSIUM CHLORIDE 150; 900 MG/100ML; MG/100ML
100 INJECTION, SOLUTION INTRAVENOUS CONTINUOUS
Status: DISCONTINUED | OUTPATIENT
Start: 2022-03-17 | End: 2022-03-18

## 2022-03-17 RX ORDER — IBUPROFEN 600 MG/1
600 TABLET ORAL EVERY 6 HOURS PRN
Status: DISCONTINUED | OUTPATIENT
Start: 2022-03-17 | End: 2022-03-18 | Stop reason: HOSPADM

## 2022-03-17 RX ADMIN — CEFTRIAXONE SODIUM 1 G: 1 INJECTION, SOLUTION INTRAVENOUS at 10:44

## 2022-03-17 RX ADMIN — IBUPROFEN 600 MG: 600 TABLET ORAL at 20:51

## 2022-03-17 RX ADMIN — Medication 10 ML: at 10:45

## 2022-03-17 RX ADMIN — POTASSIUM CHLORIDE AND SODIUM CHLORIDE 100 ML/HR: 900; 150 INJECTION, SOLUTION INTRAVENOUS at 10:56

## 2022-03-17 RX ADMIN — IBUPROFEN 600 MG: 600 TABLET ORAL at 13:03

## 2022-03-17 RX ADMIN — NICOTINE 1 PATCH: 21 PATCH, EXTENDED RELEASE TRANSDERMAL at 10:47

## 2022-03-17 RX ADMIN — TRAZODONE HYDROCHLORIDE 50 MG: 50 TABLET ORAL at 20:51

## 2022-03-17 RX ADMIN — FAMOTIDINE 20 MG: 10 INJECTION INTRAVENOUS at 10:45

## 2022-03-17 RX ADMIN — Medication 10 ML: at 20:52
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
## 2022-03-17 NOTE — PLAN OF CARE
Goal Outcome Evaluation:  Plan of Care Reviewed With: patient        Progress: no change  Outcome Evaluation: Patient remains on sodium bicarb drip and 2L o2 via nasal cannula. Patient given prn dose of ativan once to help with anxiety throughout the night. Patient also having complaints of rib pain, placed large ice pack to ribs per patient request. Reveles in place with tea colored urine. VSS. No other issues or complaints noted per nursing at this time. Will continue to monitor. Rebecca King RN
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
## 2022-03-18 ENCOUNTER — READMISSION MANAGEMENT (OUTPATIENT)
Dept: CALL CENTER | Facility: HOSPITAL | Age: 52
End: 2022-03-18

## 2022-03-18 VITALS
RESPIRATION RATE: 20 BRPM | HEART RATE: 84 BPM | WEIGHT: 153.66 LBS | TEMPERATURE: 97.7 F | SYSTOLIC BLOOD PRESSURE: 115 MMHG | HEIGHT: 61 IN | BODY MASS INDEX: 29.01 KG/M2 | OXYGEN SATURATION: 93 % | DIASTOLIC BLOOD PRESSURE: 71 MMHG

## 2022-03-18 PROBLEM — G25.71 DRUG-INDUCED AKATHISIA: Status: RESOLVED | Noted: 2022-03-16 | Resolved: 2022-03-18

## 2022-03-18 PROBLEM — F15.10: Status: ACTIVE | Noted: 2022-03-18

## 2022-03-18 PROBLEM — M62.82 NON-TRAUMATIC RHABDOMYOLYSIS: Status: ACTIVE | Noted: 2022-03-18

## 2022-03-18 PROBLEM — R41.82 ALTERED MENTAL STATUS: Status: RESOLVED | Noted: 2022-03-18 | Resolved: 2022-03-18

## 2022-03-18 PROBLEM — E86.0 DEHYDRATION: Status: ACTIVE | Noted: 2022-03-18

## 2022-03-18 PROBLEM — E83.39 HYPOPHOSPHATEMIA: Status: RESOLVED | Noted: 2022-03-18 | Resolved: 2022-03-18

## 2022-03-18 PROBLEM — E86.0 DEHYDRATION: Status: RESOLVED | Noted: 2022-03-18 | Resolved: 2022-03-18

## 2022-03-18 PROBLEM — N17.9 ACUTE RENAL FAILURE: Status: ACTIVE | Noted: 2022-03-18

## 2022-03-18 PROBLEM — R41.82 ALTERED MENTAL STATUS: Status: ACTIVE | Noted: 2022-03-18

## 2022-03-18 PROBLEM — E83.39 HYPOPHOSPHATEMIA: Status: ACTIVE | Noted: 2022-03-18

## 2022-03-18 PROBLEM — N17.9 ACUTE RENAL FAILURE: Status: RESOLVED | Noted: 2022-03-18 | Resolved: 2022-03-18

## 2022-03-18 PROBLEM — D72.829 LEUKOCYTOSIS: Status: ACTIVE | Noted: 2022-03-18

## 2022-03-18 PROBLEM — D72.829 LEUKOCYTOSIS: Status: RESOLVED | Noted: 2022-03-18 | Resolved: 2022-03-18

## 2022-03-18 PROBLEM — S22.42XD FRACTURE OF MULTIPLE RIBS OF LEFT SIDE WITH ROUTINE HEALING: Status: ACTIVE | Noted: 2022-03-18

## 2022-03-18 LAB
ALBUMIN SERPL-MCNC: 3 G/DL (ref 3.5–5.2)
ANION GAP SERPL CALCULATED.3IONS-SCNC: 8.5 MMOL/L (ref 5–15)
BASOPHILS # BLD AUTO: 0.03 10*3/MM3 (ref 0–0.2)
BASOPHILS NFR BLD AUTO: 0.3 % (ref 0–1.5)
BUN SERPL-MCNC: 11 MG/DL (ref 6–20)
BUN/CREAT SERPL: 14.3 (ref 7–25)
CALCIUM SPEC-SCNC: 8.1 MG/DL (ref 8.6–10.5)
CHLORIDE SERPL-SCNC: 96 MMOL/L (ref 98–107)
CK SERPL-CCNC: 2226 U/L (ref 20–180)
CO2 SERPL-SCNC: 34.5 MMOL/L (ref 22–29)
CREAT SERPL-MCNC: 0.77 MG/DL (ref 0.57–1)
DEPRECATED RDW RBC AUTO: 46.1 FL (ref 37–54)
EGFRCR SERPLBLD CKD-EPI 2021: 94.7 ML/MIN/1.73
EOSINOPHIL # BLD AUTO: 0.13 10*3/MM3 (ref 0–0.4)
EOSINOPHIL NFR BLD AUTO: 1.3 % (ref 0.3–6.2)
ERYTHROCYTE [DISTWIDTH] IN BLOOD BY AUTOMATED COUNT: 13.7 % (ref 12.3–15.4)
GLUCOSE BLDC GLUCOMTR-MCNC: 110 MG/DL (ref 70–99)
GLUCOSE BLDC GLUCOMTR-MCNC: 168 MG/DL (ref 70–99)
GLUCOSE SERPL-MCNC: 110 MG/DL (ref 65–99)
HCT VFR BLD AUTO: 33.5 % (ref 34–46.6)
HGB BLD-MCNC: 10.9 G/DL (ref 12–15.9)
IMM GRANULOCYTES # BLD AUTO: 0.03 10*3/MM3 (ref 0–0.05)
IMM GRANULOCYTES NFR BLD AUTO: 0.3 % (ref 0–0.5)
LYMPHOCYTES # BLD AUTO: 1.61 10*3/MM3 (ref 0.7–3.1)
LYMPHOCYTES NFR BLD AUTO: 16 % (ref 19.6–45.3)
MCH RBC QN AUTO: 29.8 PG (ref 26.6–33)
MCHC RBC AUTO-ENTMCNC: 32.5 G/DL (ref 31.5–35.7)
MCV RBC AUTO: 91.5 FL (ref 79–97)
MONOCYTES # BLD AUTO: 0.8 10*3/MM3 (ref 0.1–0.9)
MONOCYTES NFR BLD AUTO: 7.9 % (ref 5–12)
NEUTROPHILS NFR BLD AUTO: 7.47 10*3/MM3 (ref 1.7–7)
NEUTROPHILS NFR BLD AUTO: 74.2 % (ref 42.7–76)
NRBC BLD AUTO-RTO: 0 /100 WBC (ref 0–0.2)
PHOSPHATE SERPL-MCNC: 1.5 MG/DL (ref 2.5–4.5)
PLATELET # BLD AUTO: 222 10*3/MM3 (ref 140–450)
PMV BLD AUTO: 9.5 FL (ref 6–12)
POTASSIUM SERPL-SCNC: 2.8 MMOL/L (ref 3.5–5.2)
RBC # BLD AUTO: 3.66 10*6/MM3 (ref 3.77–5.28)
SODIUM SERPL-SCNC: 139 MMOL/L (ref 136–145)
WBC NRBC COR # BLD: 10.07 10*3/MM3 (ref 3.4–10.8)

## 2022-03-18 PROCEDURE — 82962 GLUCOSE BLOOD TEST: CPT

## 2022-03-18 PROCEDURE — 99239 HOSP IP/OBS DSCHRG MGMT >30: CPT | Performed by: INTERNAL MEDICINE

## 2022-03-18 PROCEDURE — 85025 COMPLETE CBC W/AUTO DIFF WBC: CPT | Performed by: INTERNAL MEDICINE

## 2022-03-18 PROCEDURE — 80069 RENAL FUNCTION PANEL: CPT | Performed by: INTERNAL MEDICINE

## 2022-03-18 PROCEDURE — 25010000002 KETOROLAC TROMETHAMINE PER 15 MG: Performed by: INTERNAL MEDICINE

## 2022-03-18 PROCEDURE — 82550 ASSAY OF CK (CPK): CPT | Performed by: INTERNAL MEDICINE

## 2022-03-18 RX ORDER — KETOROLAC TROMETHAMINE 30 MG/ML
30 INJECTION, SOLUTION INTRAMUSCULAR; INTRAVENOUS EVERY 6 HOURS PRN
Status: DISCONTINUED | OUTPATIENT
Start: 2022-03-18 | End: 2022-03-18 | Stop reason: HOSPADM

## 2022-03-18 RX ORDER — NICOTINE 21 MG/24HR
1 PATCH, TRANSDERMAL 24 HOURS TRANSDERMAL
Qty: 14 PATCH | Refills: 0 | Status: SHIPPED | OUTPATIENT
Start: 2022-03-19 | End: 2022-04-02

## 2022-03-18 RX ORDER — FAMOTIDINE 20 MG/1
20 TABLET, FILM COATED ORAL NIGHTLY
Status: DISCONTINUED | OUTPATIENT
Start: 2022-03-18 | End: 2022-03-18 | Stop reason: HOSPADM

## 2022-03-18 RX ORDER — ACETAMINOPHEN 325 MG/1
650 TABLET ORAL EVERY 6 HOURS PRN
Status: DISCONTINUED | OUTPATIENT
Start: 2022-03-18 | End: 2022-03-18 | Stop reason: HOSPADM

## 2022-03-18 RX ORDER — TRAZODONE HYDROCHLORIDE 50 MG/1
50 TABLET ORAL NIGHTLY
Qty: 30 TABLET | Refills: 0 | Status: SHIPPED | OUTPATIENT
Start: 2022-03-18 | End: 2023-03-01

## 2022-03-18 RX ORDER — FENTANYL/ROPIVACAINE/NS/PF 2-625MCG/1
15 PLASTIC BAG, INJECTION (ML) EPIDURAL
Status: COMPLETED | OUTPATIENT
Start: 2022-03-18 | End: 2022-03-18

## 2022-03-18 RX ORDER — OXYCODONE HYDROCHLORIDE 5 MG/1
5 TABLET ORAL EVERY 6 HOURS PRN
Status: DISCONTINUED | OUTPATIENT
Start: 2022-03-18 | End: 2022-03-18 | Stop reason: HOSPADM

## 2022-03-18 RX ADMIN — POTASSIUM PHOSPHATE, MONOBASIC AND POTASSIUM PHOSPHATE, DIBASIC 15 MMOL: 224; 236 INJECTION, SOLUTION, CONCENTRATE INTRAVENOUS at 15:21

## 2022-03-18 RX ADMIN — POTASSIUM PHOSPHATE, MONOBASIC AND POTASSIUM PHOSPHATE, DIBASIC 15 MMOL: 224; 236 INJECTION, SOLUTION, CONCENTRATE INTRAVENOUS at 12:25

## 2022-03-18 RX ADMIN — Medication 10 ML: at 09:46

## 2022-03-18 RX ADMIN — POTASSIUM PHOSPHATE, MONOBASIC AND POTASSIUM PHOSPHATE, DIBASIC 15 MMOL: 224; 236 INJECTION, SOLUTION, CONCENTRATE INTRAVENOUS at 09:44

## 2022-03-18 RX ADMIN — NICOTINE 1 PATCH: 21 PATCH, EXTENDED RELEASE TRANSDERMAL at 09:47

## 2022-03-18 RX ADMIN — KETOROLAC TROMETHAMINE 30 MG: 30 INJECTION, SOLUTION INTRAMUSCULAR; INTRAVENOUS at 09:44
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
## 2022-03-18 NOTE — PLAN OF CARE
Problem: Adult Inpatient Plan of Care  Goal: Plan of Care Review  Outcome: Ongoing, Progressing  Flowsheets (Taken 3/18/2022 0638)  Progress: no change  Plan of Care Reviewed With: patient  Outcome Evaluation: no acute events through the night, pt slept most of the night, pt has voided multiple times, vss, will continue to monitor.  Goal: Patient-Specific Goal (Individualized)  Outcome: Ongoing, Progressing  Goal: Absence of Hospital-Acquired Illness or Injury  Outcome: Ongoing, Progressing  Intervention: Identify and Manage Fall Risk  Recent Flowsheet Documentation  Taken 3/18/2022 0600 by Flakita Good RN  Safety Promotion/Fall Prevention: safety round/check completed  Taken 3/18/2022 0400 by Flakita Good RN  Safety Promotion/Fall Prevention: safety round/check completed  Taken 3/18/2022 0200 by Flakita Good RN  Safety Promotion/Fall Prevention: safety round/check completed  Taken 3/18/2022 0000 by Flakita Good RN  Safety Promotion/Fall Prevention: safety round/check completed  Taken 3/17/2022 2200 by Flakita Good RN  Safety Promotion/Fall Prevention: safety round/check completed  Taken 3/17/2022 2000 by Flakita Good RN  Safety Promotion/Fall Prevention: safety round/check completed  Intervention: Prevent Skin Injury  Recent Flowsheet Documentation  Taken 3/17/2022 2000 by Flakita Good RN  Body Position: position changed independently  Intervention: Prevent Infection  Recent Flowsheet Documentation  Taken 3/17/2022 2000 by Flakita Good RN  Infection Prevention:   single patient room provided   rest/sleep promoted   personal protective equipment utilized   hand hygiene promoted   equipment surfaces disinfected  Goal: Optimal Comfort and Wellbeing  Outcome: Ongoing, Progressing  Intervention: Provide Person-Centered Care  Recent Flowsheet Documentation  Taken 3/17/2022 2000 by Flakita Good RN  Trust Relationship/Rapport:   care explained   choices provided   emotional  support provided   empathic listening provided   questions answered   questions encouraged   reassurance provided   thoughts/feelings acknowledged  Goal: Readiness for Transition of Care  Outcome: Ongoing, Progressing     Problem: Hypertension Comorbidity  Goal: Blood Pressure in Desired Range  Outcome: Ongoing, Progressing  Intervention: Maintain Blood Pressure Management  Recent Flowsheet Documentation  Taken 3/17/2022 2000 by Flakita Good RN  Medication Review/Management: medications reviewed     Problem: Fluid and Electrolyte Imbalance (Acute Kidney Injury/Impairment)  Goal: Fluid and Electrolyte Balance  Outcome: Ongoing, Progressing     Problem: Oral Intake Inadequate (Acute Kidney Injury/Impairment)  Goal: Optimal Nutrition Intake  Outcome: Ongoing, Progressing     Problem: Renal Function Impairment (Acute Kidney Injury/Impairment)  Goal: Effective Renal Function  Outcome: Ongoing, Progressing  Intervention: Monitor and Support Renal Function  Recent Flowsheet Documentation  Taken 3/17/2022 2000 by Flakita Good RN  Medication Review/Management: medications reviewed     Problem: Fall Injury Risk  Goal: Absence of Fall and Fall-Related Injury  Outcome: Ongoing, Progressing  Intervention: Identify and Manage Contributors  Recent Flowsheet Documentation  Taken 3/17/2022 2000 by Flakita Good RN  Medication Review/Management: medications reviewed  Intervention: Promote Injury-Free Environment  Recent Flowsheet Documentation  Taken 3/18/2022 0600 by Flakita Good RN  Safety Promotion/Fall Prevention: safety round/check completed  Taken 3/18/2022 0400 by Flakita Good RN  Safety Promotion/Fall Prevention: safety round/check completed  Taken 3/18/2022 0200 by Flakita Good RN  Safety Promotion/Fall Prevention: safety round/check completed  Taken 3/18/2022 0000 by Flakita Good RN  Safety Promotion/Fall Prevention: safety round/check completed  Taken 3/17/2022 2200 by Flakita Good  RN  Safety Promotion/Fall Prevention: safety round/check completed  Taken 3/17/2022 2000 by Flakita Good RN  Safety Promotion/Fall Prevention: safety round/check completed     Problem: Confusion Acute  Goal: Optimal Cognitive Function  Outcome: Ongoing, Progressing     Problem: Balance Impairment (Functional Deficit)  Goal: Improved Balance and Postural Control  Outcome: Ongoing, Progressing  Intervention: Optimize Balance and Safe Activity  Recent Flowsheet Documentation  Taken 3/18/2022 0600 by Flakita Good RN  Safety Promotion/Fall Prevention: safety round/check completed  Taken 3/18/2022 0400 by Flakita Good RN  Safety Promotion/Fall Prevention: safety round/check completed  Taken 3/18/2022 0200 by Flakita Good RN  Safety Promotion/Fall Prevention: safety round/check completed  Taken 3/18/2022 0000 by Flakita Good RN  Safety Promotion/Fall Prevention: safety round/check completed  Taken 3/17/2022 2200 by Flakita Good RN  Safety Promotion/Fall Prevention: safety round/check completed  Taken 3/17/2022 2000 by Flakita Good RN  Safety Promotion/Fall Prevention: safety round/check completed     Problem: Cognitive Impairment (Functional Deficit)  Goal: Optimal Functional [place]  Outcome: Ongoing, Progressing     Problem: Coordination Impairment (Functional Deficit)  Goal: Optimal Coordination  Outcome: Ongoing, Progressing     Problem: Muscle Strength Impairment (Functional Deficit)  Goal: Improved Muscle Strength  Outcome: Ongoing, Progressing   Goal Outcome Evaluation:  Plan of Care Reviewed With: patient        Progress: no change  Outcome Evaluation: no acute events through the night, pt slept most of the night, pt has voided multiple times, vss, will continue to monitor.

## 2022-03-18 NOTE — PLAN OF CARE
Goal Outcome Evaluation:  Plan of Care Reviewed With: patient        Progress: improving Pt states that she has a safe place to go. Pt will go to live with her Mother. Nurse encouraged pt to continue with her NA meetings. Encouraged pt that one backslide that was forced on her should not interfer with her 2 years clean.

## 2022-03-18 NOTE — NURSING NOTE
Removed pts iv and went over d/c packet and educated pt to use incentive spirometer and she verbalized understanding, pt stated she had no further qustions, walked pt out.

## 2022-03-19 NOTE — OUTREACH NOTE
Prep Survey    Flowsheet Row Responses   [facility] facility patient discharged from? [facility]   Is LACE score < 7 ? Yes   Emergency Room discharge w/ pulse ox? No   Eligibility Not Eligible   What are the reasons patient is not eligible? Other   Does the patient have one of the following disease processes/diagnoses(primary or secondary)? Other   Prep survey completed? Yes          ANGELA REBOLLEDO - Registered Nurse
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
## 2022-03-21 LAB
BACTERIA SPEC AEROBE CULT: NORMAL
BACTERIA SPEC AEROBE CULT: NORMAL

## 2022-09-25 PROCEDURE — 87186 SC STD MICRODIL/AGAR DIL: CPT | Performed by: NURSE PRACTITIONER

## 2022-09-25 PROCEDURE — 87088 URINE BACTERIA CULTURE: CPT | Performed by: NURSE PRACTITIONER

## 2022-09-25 PROCEDURE — 87086 URINE CULTURE/COLONY COUNT: CPT | Performed by: NURSE PRACTITIONER

## 2022-09-27 ENCOUNTER — TELEPHONE (OUTPATIENT)
Dept: URGENT CARE | Facility: CLINIC | Age: 52
End: 2022-09-27

## 2022-09-27 NOTE — TELEPHONE ENCOUNTER
----- Message from YOEL Alonzo sent at 9/27/2022  9:35 AM EDT -----  Please notify patient of urine culture showing E. coli bacteria.  The infection does appear to be susceptible to Macrobid.  She should complete her antibiotics as prescribed and follow-up with her PCP as needed or if symptoms worsen or persist.

## 2022-09-28 ENCOUNTER — TELEPHONE (OUTPATIENT)
Dept: URGENT CARE | Facility: CLINIC | Age: 52
End: 2022-09-28

## 2023-03-01 ENCOUNTER — LAB (OUTPATIENT)
Dept: LAB | Facility: HOSPITAL | Age: 53
End: 2023-03-01
Payer: COMMERCIAL

## 2023-03-01 ENCOUNTER — OFFICE VISIT (OUTPATIENT)
Dept: FAMILY MEDICINE CLINIC | Facility: CLINIC | Age: 53
End: 2023-03-01
Payer: COMMERCIAL

## 2023-03-01 VITALS
TEMPERATURE: 98.4 F | BODY MASS INDEX: 29.23 KG/M2 | WEIGHT: 154.8 LBS | SYSTOLIC BLOOD PRESSURE: 136 MMHG | HEART RATE: 95 BPM | DIASTOLIC BLOOD PRESSURE: 98 MMHG | OXYGEN SATURATION: 96 % | HEIGHT: 61 IN

## 2023-03-01 DIAGNOSIS — F32.A DEPRESSION, UNSPECIFIED DEPRESSION TYPE: ICD-10-CM

## 2023-03-01 DIAGNOSIS — G25.81 RLS (RESTLESS LEGS SYNDROME): ICD-10-CM

## 2023-03-01 DIAGNOSIS — Z76.89 ESTABLISHING CARE WITH NEW DOCTOR, ENCOUNTER FOR: Primary | ICD-10-CM

## 2023-03-01 DIAGNOSIS — Z13.220 SCREENING FOR LIPID DISORDERS: ICD-10-CM

## 2023-03-01 DIAGNOSIS — Z13.29 SCREENING FOR THYROID DISORDER: ICD-10-CM

## 2023-03-01 DIAGNOSIS — I10 ESSENTIAL HYPERTENSION: ICD-10-CM

## 2023-03-01 DIAGNOSIS — F43.21 GRIEF: ICD-10-CM

## 2023-03-01 DIAGNOSIS — F41.1 GAD (GENERALIZED ANXIETY DISORDER): ICD-10-CM

## 2023-03-01 LAB
BASOPHILS # BLD AUTO: 0.05 10*3/MM3 (ref 0–0.2)
BASOPHILS NFR BLD AUTO: 0.7 % (ref 0–1.5)
DEPRECATED RDW RBC AUTO: 39.3 FL (ref 37–54)
EOSINOPHIL # BLD AUTO: 0.34 10*3/MM3 (ref 0–0.4)
EOSINOPHIL NFR BLD AUTO: 4.8 % (ref 0.3–6.2)
ERYTHROCYTE [DISTWIDTH] IN BLOOD BY AUTOMATED COUNT: 12.3 % (ref 12.3–15.4)
HCT VFR BLD AUTO: 40.1 % (ref 34–46.6)
HGB BLD-MCNC: 13.7 G/DL (ref 12–15.9)
IMM GRANULOCYTES # BLD AUTO: 0.02 10*3/MM3 (ref 0–0.05)
IMM GRANULOCYTES NFR BLD AUTO: 0.3 % (ref 0–0.5)
LYMPHOCYTES # BLD AUTO: 1.95 10*3/MM3 (ref 0.7–3.1)
LYMPHOCYTES NFR BLD AUTO: 27.4 % (ref 19.6–45.3)
MCH RBC QN AUTO: 29.8 PG (ref 26.6–33)
MCHC RBC AUTO-ENTMCNC: 34.2 G/DL (ref 31.5–35.7)
MCV RBC AUTO: 87.2 FL (ref 79–97)
MONOCYTES # BLD AUTO: 0.64 10*3/MM3 (ref 0.1–0.9)
MONOCYTES NFR BLD AUTO: 9 % (ref 5–12)
NEUTROPHILS NFR BLD AUTO: 4.12 10*3/MM3 (ref 1.7–7)
NEUTROPHILS NFR BLD AUTO: 57.8 % (ref 42.7–76)
NRBC BLD AUTO-RTO: 0 /100 WBC (ref 0–0.2)
PLATELET # BLD AUTO: 289 10*3/MM3 (ref 140–450)
PMV BLD AUTO: 9.8 FL (ref 6–12)
RBC # BLD AUTO: 4.6 10*6/MM3 (ref 3.77–5.28)
WBC NRBC COR # BLD: 7.12 10*3/MM3 (ref 3.4–10.8)

## 2023-03-01 PROCEDURE — 99214 OFFICE O/P EST MOD 30 MIN: CPT | Performed by: NURSE PRACTITIONER

## 2023-03-01 PROCEDURE — 80061 LIPID PANEL: CPT

## 2023-03-01 PROCEDURE — 80050 GENERAL HEALTH PANEL: CPT

## 2023-03-01 PROCEDURE — 36415 COLL VENOUS BLD VENIPUNCTURE: CPT

## 2023-03-01 RX ORDER — ROPINIROLE 2 MG/1
2 TABLET, FILM COATED ORAL NIGHTLY
Qty: 30 TABLET | Refills: 3 | Status: SHIPPED | OUTPATIENT
Start: 2023-03-01

## 2023-03-01 RX ORDER — VENLAFAXINE HYDROCHLORIDE 150 MG/1
150 CAPSULE, EXTENDED RELEASE ORAL DAILY
COMMUNITY
Start: 2023-01-05

## 2023-03-01 RX ORDER — ROPINIROLE 1 MG/1
TABLET, FILM COATED ORAL
COMMUNITY
Start: 2023-01-30 | End: 2023-03-01 | Stop reason: SDUPTHER

## 2023-03-01 RX ORDER — ALBUTEROL SULFATE 90 UG/1
2 AEROSOL, METERED RESPIRATORY (INHALATION) EVERY 4 HOURS PRN
COMMUNITY

## 2023-03-01 NOTE — ASSESSMENT & PLAN NOTE
Hypertension is newly identified.  Continue current treatment regimen.  Dietary sodium restriction.  Weight loss.  Regular aerobic exercise.  Stop smoking.  Ambulatory blood pressure monitoring.  Blood pressure will be reassessed in 2 weeks. Advised pt must take medication every day as prescribed.

## 2023-03-01 NOTE — ASSESSMENT & PLAN NOTE
Psychological condition is newly identified.  Continue current treatment regimen.  Regular aerobic exercise.  Referral to psychiatry.  Psychological condition  will be reassessed at the next regular appointment.

## 2023-03-01 NOTE — PATIENT INSTRUCTIONS
Blood Pressure Record Sheet  To take your blood pressure, you will need a blood pressure machine. You may be prescribed one, or you can buy a blood pressure machine (blood pressure monitor) at your clinic, drug store, or online. When choosing one, look for these features:  An automatic monitor that has an arm cuff.  A cuff that wraps snugly, but not too tightly, around your upper arm. You should be able to fit only one finger between your arm and the cuff.  A device that stores blood pressure reading results.  Do not choose a monitor that measures your blood pressure from your wrist or finger.  Follow your health care provider's instructions for how to take your blood pressure. To use this form:  Get one reading in the morning (a.m.) before you take any medicines.  Get one reading in the evening (p.m.) before supper.  Take at least two readings with each blood pressure check. This makes sure the results are correct. Wait 1-2 minutes between measurements.  Write down the results in the spaces on this form.  Repeat this once a week, or as told by your health care provider.  Make a follow-up appointment with your health care provider to discuss the results.  Blood pressure log  Date: _______________________  a.m. _____________________(1st reading) _____________________(2nd reading)  p.m. _____________________(1st reading) _____________________(2nd reading)  Date: _______________________  a.m. _____________________(1st reading) _____________________(2nd reading)  p.m. _____________________(1st reading) _____________________(2nd reading)  Date: _______________________  a.m. _____________________(1st reading) _____________________(2nd reading)  p.m. _____________________(1st reading) _____________________(2nd reading)  Date: _______________________  a.m. _____________________(1st reading) _____________________(2nd reading)  p.m. _____________________(1st reading) _____________________(2nd reading)  Date:  _______________________  a.m. _____________________(1st reading) _____________________(2nd reading)  p.m. _____________________(1st reading) _____________________(2nd reading)  This information is not intended to replace advice given to you by your health care provider. Make sure you discuss any questions you have with your health care provider.  Document Revised: 09/01/2022 Document Reviewed: 09/01/2022  [employer] Patient Education © 2022 [employer]  Blood Pressure Record Sheet  To take your blood pressure, you will need a blood pressure machine. You may be prescribed one, or you can buy a blood pressure machine (blood pressure monitor) at your clinic, drug store, or online. When choosing one, look for these features:  An automatic monitor that has an arm cuff.  A cuff that wraps snugly, but not too tightly, around your upper arm. You should be able to fit only one finger between your arm and the cuff.  A device that stores blood pressure reading results.  Do not choose a monitor that measures your blood pressure from your wrist or finger.  Follow your health care provider's instructions for how to take your blood pressure. To use this form:  Get one reading in the morning (a.m.) before you take any medicines.  Get one reading in the evening (p.m.) before supper.  Take at least two readings with each blood pressure check. This makes sure the results are correct. Wait 1-2 minutes between measurements.  Write down the results in the spaces on this form.  Repeat this once a week, or as told by your health care provider.  Make a follow-up appointment with your health care provider to discuss the results.  Blood pressure log  Date: _______________________  a.m. _____________________(1st reading) _____________________(2nd reading)  p.m. _____________________(1st reading) _____________________(2nd reading)  Date: _______________________  a.m. _____________________(1st reading) _____________________(2nd reading)  p.m.  _____________________(1st reading) _____________________(2nd reading)  Date: _______________________  a.m. _____________________(1st reading) _____________________(2nd reading)  p.m. _____________________(1st reading) _____________________(2nd reading)  Date: _______________________  a.m. _____________________(1st reading) _____________________(2nd reading)  p.m. _____________________(1st reading) _____________________(2nd reading)  Date: _______________________  a.m. _____________________(1st reading) _____________________(2nd reading)  p.m. _____________________(1st reading) _____________________(2nd reading)  This information is not intended to replace advice given to you by your health care provider. Make sure you discuss any questions you have with your health care provider.  Document Revised: 09/01/2022 Document Reviewed: 09/01/2022  Elsevier Patient Education © 2022 [employer]

## 2023-03-01 NOTE — ASSESSMENT & PLAN NOTE
Patient's depression is recurrent and is mild without psychosis. Their depression is currently active and the condition is newly identified. This will be reassessed at the next regular appointment. F/U as described:patient will continue current medication therapy and patient referred to Mental Health Specialist. Discussed treatment options with patient. Referral to PMHNP at [facility].

## 2023-03-01 NOTE — PROGRESS NOTES
"Chief Complaint  Establish Care (Patient prev seen by dr bradley/) and Anxiety (Pt lost daughter last year in April.Pt states she doesn't feel like the Effexor is helping, prescribed by provider at [facility] in [place].)    Iliana Correa presents to [facility]  History of Present Illness  New  patient/establish care:    Previous provider: Dr. Bradley    Lives in: [place]     /single: Single. Lives with mother   Employed: unemployed     Current medications: Reviewed   Previous labs: Reviewed     Nicotine/ETOH use: cigarettes, denies ETOH use     Pt's c/o increased anxiety/depression x several months. Pt lost her daughter to drug overdose last April. Takes effexor 150mg PO qd but states she doesn't feel like it is working as well as it should. Denies any thoughts of suicide or self harm.     Pt's BP elevated today 136/98. She is prescribed lisinopril 20mg PO qd but states she has not taken for quite some time. Denies blurred vision, chest pain, SOB, HA, dizziness or other. States she forgets to take medication.     Pt c/o RLS at night. Takes requip 1mg PO qhs but states it is not working to control symptoms. Interested in medication increase.     Reviewed all recent labs and medications.      Objective   Vital Signs:  /98 Comment: manual BP  Pulse 95   Temp 98.4 °F (36.9 °C) (Temporal)   Ht 154.9 cm (61\")   Wt 70.2 kg (154 lb 12.8 oz)   SpO2 96%   BMI 29.25 kg/m²   Estimated body mass index is 29.25 kg/m² as calculated from the following:    Height as of this encounter: 154.9 cm (61\").    Weight as of this encounter: 70.2 kg (154 lb 12.8 oz).       BMI is >= 25 and <30. (Overweight) The following options were offered after discussion;: exercise counseling/recommendations and nutrition counseling/recommendations      Physical Exam  Vitals reviewed.   Constitutional:       General: She is not in acute distress.  HENT:      Head: " Normocephalic.      Right Ear: Tympanic membrane normal.      Left Ear: Tympanic membrane normal.      Nose: Nose normal.      Mouth/Throat:      Pharynx: Oropharynx is clear. No posterior oropharyngeal erythema.   Eyes:      General: No scleral icterus.     Extraocular Movements: Extraocular movements intact.      Conjunctiva/sclera: Conjunctivae normal.      Pupils: Pupils are equal, round, and reactive to light.   Cardiovascular:      Rate and Rhythm: Normal rate and regular rhythm.      Pulses: Normal pulses.      Heart sounds: Normal heart sounds.   Pulmonary:      Effort: Pulmonary effort is normal.      Breath sounds: Normal breath sounds.   Abdominal:      General: Bowel sounds are normal.      Palpations: Abdomen is soft.   Musculoskeletal:         General: Normal range of motion.      Cervical back: Neck supple.   Skin:     General: Skin is warm and dry.   Neurological:      Mental Status: She is alert and oriented to person, place, and time.   Psychiatric:         Mood and Affect: Mood normal.         Behavior: Behavior normal.         Thought Content: Thought content normal.         Judgment: Judgment normal.        Result Review :    Common labs    Common Labs 3/16/22 3/16/22 3/16/22 3/17/22 3/17/22 3/18/22 3/18/22    0506 0550 0550 0531 0531 0459 0459   Glucose 146 (A)  145 (A)  128 (A)  110 (A)   BUN   47 (A)  20  11   Creatinine   2.23 (A)  0.84  0.77   Sodium 134.9 (A)  136  134 (A)  139   Potassium   3.9  3.0 (A)  2.8 (A)   Chloride   101  93 (A)  96 (A)   Calcium   9.0  8.3 (A)  8.1 (A)   Albumin   3.70  3.00 (A)  3.00 (A)   Total Bilirubin   0.6  0.5     Alkaline Phosphatase   67  58     AST (SGOT)   634 (A)  319 (A)     ALT (SGPT)   157 (A)  125 (A)     WBC  22.38 (A)  14.10 (A)  10.07    Hemoglobin  12.5  10.9 (A)  10.9 (A)    Hematocrit  37.8  31.8 (A)  33.5 (A)    Platelets  290  217  222    (A) Abnormal value       Comments are available for some flowsheets but are not being displayed.            Data reviewed: Recent hospitalization notes hospital adm 3/15/22             Assessment and Plan   Diagnoses and all orders for this visit:    1. Establishing care with new doctor, encounter for (Primary)    2. Essential hypertension  Assessment & Plan:  Hypertension is newly identified.  Continue current treatment regimen.  Dietary sodium restriction.  Weight loss.  Regular aerobic exercise.  Stop smoking.  Ambulatory blood pressure monitoring.  Blood pressure will be reassessed in 2 weeks. Advised pt must take medication every day as prescribed.     Orders:  -     Comprehensive Metabolic Panel; Future  -     CBC & Differential; Future    3. MECHE (generalized anxiety disorder)  Assessment & Plan:  Psychological condition is newly identified.  Continue current treatment regimen.  Regular aerobic exercise.  Referral to psychiatry.  Psychological condition  will be reassessed at the next regular appointment.    Orders:  -     Ambulatory Referral to Psychiatry    4. Depression, unspecified depression type  Assessment & Plan:  Patient's depression is recurrent and is mild without psychosis. Their depression is currently active and the condition is newly identified. This will be reassessed at the next regular appointment. F/U as described:patient will continue current medication therapy and patient referred to Mental Health Specialist. Discussed treatment options with patient. Referral to PMHNP at [facility].     Orders:  -     Ambulatory Referral to Psychiatry    5. Grief  Assessment & Plan:  Newly identified   Referral to psychiatry     Orders:  -     Ambulatory Referral to Psychiatry    6. Screening for thyroid disorder  -     TSH; Future    7. Screening for lipid disorders  -     Lipid Panel; Future    8. RLS (restless legs syndrome)  Assessment & Plan:  Newly identified   Increase requip to 2mg PO qhs   Discussed lifestyle mods to treat as well       Other orders  -     rOPINIRole (REQUIP) 2 MG tablet;  Take 1 tablet by mouth Every Night.  Dispense: 30 tablet; Refill: 3           Follow Up   Return in about 2 weeks (around 3/15/2023), or if symptoms worsen or fail to improve.  Patient was given instructions and counseling regarding her condition or for health maintenance advice. Please see specific information pulled into the AVS if appropriate.

## 2023-03-02 DIAGNOSIS — E03.9 HYPOTHYROIDISM, UNSPECIFIED TYPE: ICD-10-CM

## 2023-03-02 DIAGNOSIS — R74.8 ELEVATED LIVER ENZYMES: Primary | ICD-10-CM

## 2023-03-02 DIAGNOSIS — E78.5 HYPERLIPIDEMIA, UNSPECIFIED HYPERLIPIDEMIA TYPE: ICD-10-CM

## 2023-03-02 LAB
ALBUMIN SERPL-MCNC: 4.7 G/DL (ref 3.5–5.2)
ALBUMIN/GLOB SERPL: 1.5 G/DL
ALP SERPL-CCNC: 77 U/L (ref 39–117)
ALT SERPL W P-5'-P-CCNC: 114 U/L (ref 1–33)
ANION GAP SERPL CALCULATED.3IONS-SCNC: 9.8 MMOL/L (ref 5–15)
AST SERPL-CCNC: 69 U/L (ref 1–32)
BILIRUB SERPL-MCNC: 0.3 MG/DL (ref 0–1.2)
BUN SERPL-MCNC: 20 MG/DL (ref 6–20)
BUN/CREAT SERPL: 20.8 (ref 7–25)
CALCIUM SPEC-SCNC: 10.2 MG/DL (ref 8.6–10.5)
CHLORIDE SERPL-SCNC: 98 MMOL/L (ref 98–107)
CHOLEST SERPL-MCNC: 307 MG/DL (ref 0–200)
CO2 SERPL-SCNC: 28.2 MMOL/L (ref 22–29)
CREAT SERPL-MCNC: 0.96 MG/DL (ref 0.57–1)
EGFRCR SERPLBLD CKD-EPI 2021: 72.2 ML/MIN/1.73
GLOBULIN UR ELPH-MCNC: 3.2 GM/DL
GLUCOSE SERPL-MCNC: 88 MG/DL (ref 65–99)
HDLC SERPL-MCNC: 70 MG/DL (ref 40–60)
LDLC SERPL CALC-MCNC: 226 MG/DL (ref 0–100)
LDLC/HDLC SERPL: 3.17 {RATIO}
POTASSIUM SERPL-SCNC: 4.8 MMOL/L (ref 3.5–5.2)
PROT SERPL-MCNC: 7.9 G/DL (ref 6–8.5)
SODIUM SERPL-SCNC: 136 MMOL/L (ref 136–145)
TRIGL SERPL-MCNC: 75 MG/DL (ref 0–150)
TSH SERPL DL<=0.05 MIU/L-ACNC: 5.45 UIU/ML (ref 0.27–4.2)
VLDLC SERPL-MCNC: 11 MG/DL (ref 5–40)

## 2023-03-02 RX ORDER — ATORVASTATIN CALCIUM 40 MG/1
40 TABLET, FILM COATED ORAL DAILY
Qty: 30 TABLET | Refills: 5 | Status: SHIPPED | OUTPATIENT
Start: 2023-03-02

## 2023-03-02 RX ORDER — LEVOTHYROXINE SODIUM 0.03 MG/1
25 TABLET ORAL DAILY
Qty: 30 TABLET | Refills: 5 | Status: SHIPPED | OUTPATIENT
Start: 2023-03-02

## 2023-03-24 ENCOUNTER — OFFICE VISIT (OUTPATIENT)
Dept: BEHAVIORAL HEALTH | Facility: CLINIC | Age: 53
End: 2023-03-24
Payer: COMMERCIAL

## 2023-03-24 VITALS
WEIGHT: 147.1 LBS | HEART RATE: 97 BPM | BODY MASS INDEX: 27.77 KG/M2 | SYSTOLIC BLOOD PRESSURE: 137 MMHG | HEIGHT: 61 IN | DIASTOLIC BLOOD PRESSURE: 94 MMHG

## 2023-03-24 DIAGNOSIS — F33.3 SEVERE EPISODE OF RECURRENT MAJOR DEPRESSIVE DISORDER, WITH PSYCHOTIC FEATURES: Primary | ICD-10-CM

## 2023-03-24 DIAGNOSIS — F43.10 POST TRAUMATIC STRESS DISORDER (PTSD): ICD-10-CM

## 2023-03-24 DIAGNOSIS — F51.01 PRIMARY INSOMNIA: ICD-10-CM

## 2023-03-24 DIAGNOSIS — F41.1 GENERALIZED ANXIETY DISORDER: ICD-10-CM

## 2023-03-24 PROCEDURE — 1160F RVW MEDS BY RX/DR IN RCRD: CPT

## 2023-03-24 PROCEDURE — 1159F MED LIST DOCD IN RCRD: CPT

## 2023-03-24 PROCEDURE — 3080F DIAST BP >= 90 MM HG: CPT

## 2023-03-24 PROCEDURE — 3075F SYST BP GE 130 - 139MM HG: CPT

## 2023-03-24 PROCEDURE — 90792 PSYCH DIAG EVAL W/MED SRVCS: CPT

## 2023-03-24 RX ORDER — ARIPIPRAZOLE 10 MG/1
10 TABLET ORAL DAILY
Qty: 30 TABLET | Refills: 1 | Status: SHIPPED | OUTPATIENT
Start: 2023-03-24

## 2023-03-24 RX ORDER — PRAZOSIN HYDROCHLORIDE 1 MG/1
1 CAPSULE ORAL NIGHTLY
Qty: 30 CAPSULE | Refills: 1 | Status: SHIPPED | OUTPATIENT
Start: 2023-03-24
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
## 2023-03-24 NOTE — PATIENT INSTRUCTIONS
1.  Please return to clinic at your next scheduled visit.  Please contact the clinic ([phone]) at least 24 hours prior in the event you need to cancel.  2.  Do no harm to yourself or others.    3.  Avoid alcohol and drugs.    4.  Take all medications as prescribed.  Please contact the clinic with any concerns. If you are in need of medication refills, please call the clinic at [phone].    5. Should you want to get in touch with your provider, YOEL Hagen, please contact the office ([phone]), and staff will be able to page [facility] directly.  6. In the event you have personal crisis, contact the following crisis numbers: Suicide Prevention Hotline 1-789.779.1128; [facility] Helpline 1-543-926-XHKQ; [facility] Emergency Room [phone]; text HELLO to 310207; or 676.     SPECIFIC RECOMMENDATIONS:     1.      Medications discussed at this encounter: ABILIFY (ARIPIPRAZOLE) Risks, benefits, alternatives discussed with patient including increased energy, exacerbation of irritability, akathisia, GI upset, orthostatic hypotension, increased appetite, tardive dyskinesia.  After discussion of these risks and benefits, the patient voiced understanding and agreed to proceed.   PRAZOSIN (MINIPRESS) Risks, benefits, alternatives, and side effects discussed with patient including sedation, dizziness/falls risk, hypotension, GI upset. After discussion of these risks and benefits, the patient voiced understanding and agreed to proceed.                      2.      Psychotherapy recommendations: We will continue therapy at future visits.     3.     Return to clinic:  3 Weeks

## 2023-11-02 ENCOUNTER — TELEPHONE (OUTPATIENT)
Dept: BEHAVIORAL HEALTH | Facility: CLINIC | Age: 53
End: 2023-11-02
Payer: COMMERCIAL

## 2023-11-02 NOTE — TELEPHONE ENCOUNTER
Received call via on call service from patient whom reports she is trying to get scheduled with YOEL Cox and has been unsuccessful.  Poor connection on phone, patient instructed to call the office in the morning as the office is open at 8 am and closes at 430 pm. As this provider does not have access to schedule appointment, noted patient has not been seen since 3/24/23 and no showed on 4/14/23. Will forward note to provider as an update.

## 2024-02-12 ENCOUNTER — E-VISIT (OUTPATIENT)
Dept: ADMINISTRATIVE | Facility: OTHER | Age: 54
End: 2024-02-12
Payer: COMMERCIAL

## 2024-02-12 ENCOUNTER — TELEMEDICINE (OUTPATIENT)
Dept: FAMILY MEDICINE CLINIC | Facility: TELEHEALTH | Age: 54
End: 2024-02-12
Payer: COMMERCIAL

## 2024-02-12 DIAGNOSIS — U07.1 COVID-19: Primary | ICD-10-CM

## 2024-02-12 RX ORDER — TRAZODONE HYDROCHLORIDE 50 MG/1
50 TABLET ORAL NIGHTLY PRN
COMMUNITY
Start: 2023-12-28

## 2024-10-15 ENCOUNTER — APPOINTMENT (OUTPATIENT)
Dept: GENERAL RADIOLOGY | Facility: HOSPITAL | Age: 54
End: 2024-10-15
Payer: COMMERCIAL

## 2024-10-15 ENCOUNTER — HOSPITAL ENCOUNTER (EMERGENCY)
Facility: HOSPITAL | Age: 54
Discharge: HOME | End: 2024-10-15
Attending: EMERGENCY MEDICINE | Admitting: EMERGENCY MEDICINE
Payer: COMMERCIAL

## 2024-10-15 VITALS
WEIGHT: 123.46 LBS | RESPIRATION RATE: 20 BRPM | OXYGEN SATURATION: 99 % | SYSTOLIC BLOOD PRESSURE: 146 MMHG | DIASTOLIC BLOOD PRESSURE: 93 MMHG | HEIGHT: 61 IN | HEART RATE: 93 BPM | BODY MASS INDEX: 23.31 KG/M2 | TEMPERATURE: 97.8 F

## 2024-10-15 DIAGNOSIS — S90.852A FOREIGN BODY IN LEFT FOOT, INITIAL ENCOUNTER: Primary | ICD-10-CM

## 2024-10-15 PROCEDURE — 73630 X-RAY EXAM OF FOOT: CPT

## 2024-10-15 PROCEDURE — 99283 EMERGENCY DEPT VISIT LOW MDM: CPT

## 2024-10-15 PROCEDURE — 90471 IMMUNIZATION ADMIN: CPT

## 2024-10-15 PROCEDURE — 25010000002 TETANUS-DIPHTH-ACELL PERTUSSIS 5-2.5-18.5 LF-MCG/0.5 SUSPENSION PREFILLED SYRINGE

## 2024-10-15 PROCEDURE — 90472 IMMUNIZATION ADMIN EACH ADD: CPT

## 2024-10-15 PROCEDURE — 90715 TDAP VACCINE 7 YRS/> IM: CPT

## 2024-10-15 RX ORDER — CEPHALEXIN 500 MG/1
500 CAPSULE ORAL 2 TIMES DAILY
Qty: 10 CAPSULE | Refills: 0 | Status: SHIPPED | OUTPATIENT
Start: 2024-10-15

## 2024-10-15 RX ORDER — HYDROCODONE BITARTRATE AND ACETAMINOPHEN 7.5; 325 MG/1; MG/1
1 TABLET ORAL EVERY 6 HOURS PRN
Qty: 12 TABLET | Refills: 0 | Status: SHIPPED | OUTPATIENT
Start: 2024-10-15

## 2024-10-15 RX ADMIN — TETANUS TOXOID, REDUCED DIPHTHERIA TOXOID AND ACELLULAR PERTUSSIS VACCINE, ADSORBED 0.5 ML: 5; 2.5; 8; 8; 2.5 SUSPENSION INTRAMUSCULAR at 18:28

## 2024-10-15 NOTE — DISCHARGE INSTRUCTIONS
Thank you for allowing us to provide care for you today.  Workup today included a tetanus updated vaccination along with a left foot x-ray.  Left foot x-ray today revealed a foreign body that appears as a sewing needle.  As discussed, risk versus reward for trying to remove said foreign body necessitates that pain management and prophylactic antimicrobial management are best at this time.  He will be prescribed a antibiotic and pain medication at your pharmacy and a referral has been placed for you to be seen by Dr. Kaye with [facility] podiatry services.  In the event you develop new or worsening left foot pain, worsening swelling, paresthesias of that foot, or new onset fevers and chills please return to the ED immediately for further workup thank you

## 2024-10-15 NOTE — ED PROVIDER NOTES
Time: 5:39 PM EDT  Date of encounter:  10/15/2024  Independent Historian/Clinical History and Information was obtained by:   Patient    History is limited by: N/A    Chief Complaint   Patient presents with    Foot Injury    Foreign Body in Skin         History of Present Illness:  Patient is a 54 y.o. year old female who presents to the emergency department for evaluation of body in left foot x 2 days.  Patient reports ongoing pain in her left foot.  Patient reports difficulty ambulating at this time due to pain.  Patient denies known history of diabetes or paresthesias or numbness in her feet.    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    Depression     Drug abuse and dependence     Headache     Hypertension     Night sweats     Shortness of breath      Past Surgical History:   Procedure Laterality Date    CHOLECYSTECTOMY      TONSILLECTOMY      TUBAL ABDOMINAL LIGATION       Family History   Problem Relation Age of Onset    Depression Mother     Bipolar disorder Mother     Anxiety disorder Mother     Alcohol abuse Father     COPD Father     Depression Sister     No Known Problems Brother     No Known Problems Maternal Aunt     No Known Problems Paternal Aunt     No Known Problems Maternal Uncle     No Known Problems Paternal Uncle     Heart attack Maternal Grandfather     Depression Maternal Grandmother     Bipolar disorder Maternal Grandmother     No Known Problems Paternal Grandfather     No Known Problems Paternal Grandmother     No Known Problems Cousin     Depression Daughter     Bipolar disorder Daughter     Anxiety disorder Daughter     ADD / ADHD Daughter     ADD / ADHD Nephew     Dementia Neg Hx     Drug abuse Neg Hx     OCD Neg Hx     Paranoid behavior Neg Hx     Schizophrenia Neg Hx     Seizures Neg Hx      Self-Injurious Behavior  Neg Hx     Suicide Attempts Neg Hx        Home Medications:  Prior to Admission medications    Medication Sig Start Date End Date Taking? Authorizing Provider   busPIRone (BUSPAR) 10 MG tablet Take 1 tablet by mouth 3 times a day. 8/12/24   Jigna Bermudez MD   citalopram (CeleXA) 20 MG tablet Take 1 tablet by mouth Daily. 8/12/24   Jinga Bermudez MD   diclofenac (VOLTAREN) 75 MG EC tablet Take 1 tablet by mouth Every 12 (Twelve) Hours. 8/12/24   Jigna Bermudez MD   lisinopril (PRINIVIL,ZESTRIL) 20 MG tablet Take 1 tablet by mouth Daily.    Emergency, Nurse Epic, RN   prazosin (MINIPRESS) 1 MG capsule Take 1 capsule by mouth Every Night. 3/24/23   Afua Powell APRN   rOPINIRole (REQUIP) 1 MG tablet Take 1 tablet by mouth Every Night. 8/12/24   Jigna Bermudez MD   traZODone (DESYREL) 50 MG tablet Take 1 tablet by mouth At Night As Needed. 12/28/23   Jigna Bermudez MD   venlafaxine XR (EFFEXOR-XR) 150 MG 24 hr capsule Take 1 capsule by mouth Daily. 1/5/23   Jigna Bermudez MD        Social History:   Social History     Tobacco Use    Smoking status: Heavy Smoker     Current packs/day: 0.50     Types: Cigarettes     Passive exposure: Current    Smokeless tobacco: Never   Vaping Use    Vaping status: Every Day    Substances: Nicotine, Flavoring    Devices: Disposable   Substance Use Topics    Alcohol use: Not Currently    Drug use: Not Currently     Types: Methamphetamines     Comment: CLEAN SINCE MARCH 2021         Review of Systems:  Review of Systems   Constitutional:  Negative for fever.   HENT:  Negative for sore throat.    Respiratory:  Negative for shortness of breath.    Cardiovascular:  Negative for chest pain.   Gastrointestinal:  Negative for abdominal pain.   Endocrine: Negative for polyuria.   Genitourinary:  Negative for dysuria.   Musculoskeletal:  Negative for neck pain.   Skin:  Positive for wound. Negative for rash.   Neurological:  " Negative for headaches.   All other systems reviewed and are negative.       Physical Exam:  /93 (BP Location: Right arm, Patient Position: Sitting)   Pulse 93   Temp 97.8 °F (36.6 °C) (Oral)   Resp 20   Ht 154.9 cm (61\")   Wt 56 kg (123 lb 7.3 oz)   SpO2 99%   BMI 23.33 kg/m²         Physical Exam  Vitals and nursing note reviewed.   Constitutional:       General: She is not in acute distress.     Appearance: Normal appearance. She is not toxic-appearing.   HENT:      Head: Normocephalic and atraumatic.      Jaw: There is normal jaw occlusion.   Eyes:      General: Lids are normal.      Extraocular Movements: Extraocular movements intact.      Conjunctiva/sclera: Conjunctivae normal.      Pupils: Pupils are equal, round, and reactive to light.   Cardiovascular:      Rate and Rhythm: Normal rate and regular rhythm.      Pulses: Normal pulses.           Dorsalis pedis pulses are 2+ on the right side and 2+ on the left side.        Posterior tibial pulses are 2+ on the right side and 2+ on the left side.      Heart sounds: Normal heart sounds.   Pulmonary:      Effort: Pulmonary effort is normal. No respiratory distress.      Breath sounds: Normal breath sounds. No wheezing or rhonchi.   Abdominal:      General: Abdomen is flat.      Palpations: Abdomen is soft.      Tenderness: There is no abdominal tenderness. There is no guarding or rebound.   Musculoskeletal:         General: Normal range of motion.      Cervical back: Normal range of motion and neck supple.      Right lower leg: No edema.      Left lower leg: No edema.        Feet:    Feet:      Right foot:      Skin integrity: Skin integrity normal.      Left foot:      Skin integrity: Skin integrity normal.      Comments: Evidence of small pinpoint erythema on the left foot found on the sole.  Extremely tender to palpitation with minor swelling.  Skin:     General: Skin is warm and dry.   Neurological:      Mental Status: She is alert and oriented " to person, place, and time. Mental status is at baseline.   Psychiatric:         Mood and Affect: Mood normal.                      Procedures:  Procedures      Medical Decision Making:      Comorbidities that affect care:    Hypertension    External Notes reviewed:    Previous Clinic Note: Previous urgent care visit on 8/15/2024 reviewed.  Patient was seen for acute lower back pain.      The following orders were placed and all results were independently analyzed by me:  Orders Placed This Encounter   Procedures    XR Foot 3+ View Left    Ambulatory Referral to Podiatry       Medications Given in the Emergency Department:  Medications   Tetanus-Diphth-Acell Pertussis (BOOSTRIX) injection 0.5 mL (0.5 mL Intramuscular Given 10/15/24 1828)        ED Course:    The patient was initially evaluated in the triage area where orders were placed. The patient was later dispositioned by Rodrigo Dai PA-C.      The patient was advised to stay for completion of workup which includes but is not limited to communication of labs and radiological results, reassessment and plan. The patient was advised that leaving prior to disposition by a provider could result in critical findings that are not communicated to the patient.     ED Course as of 10/15/24 1849   Tue Oct 15, 2024   1744 Patient cannot report last known tetanus.  Order placed for tetanus vaccination. [CB]   1848 Discussed with patient finding on x-rays at bedside.  Physical examination also revealed no evidence of foreign body at the surface that could be possibly extracted.  Referral placed with podiatry outpatient with prophylactic antibiotics and pain management sent outpatient. [CB]      ED Course User Index  [CB] Rodrigo Dai PA-C       Labs:    Lab Results (last 24 hours)       ** No results found for the last 24 hours. **             Imaging:    XR Foot 3+ View Left    Result Date: 10/15/2024  XR FOOT 3+ VW LEFT Date of Exam: 10/15/2024 5:40 PM EDT  Indication: PT may have a sewing needle embedded in bottom of left foot. Comparison: None available. Findings: The distal tibia and fibula are intact. The talus and calcaneus are intact. Calcaneal spur. The tarsal and metatarsal bones are intact. The phalanges are intact. Bony alignment is normal. No lytic or blastic disease. 1.2 cm linear metallic density within the plantar aspect of the foot involving the soft tissues.     Linear metallic foreign body within the plantar aspect of the foot embedded within the soft tissues. Electronically Signed: Sathish Mayo MD  10/15/2024 6:20 PM EDT  Workstation ID: [id number]       Differential Diagnosis and Discussion:      Wound Evaluation: Differential diagnosis includes but is not limited to laceration, abrasion, puncture, burn, ulcer, cellulitis, abscess, vasculitis, malignancy, and rash.    All X-rays impressions were independently interpreted by me.    MDM                   Patient Care Considerations:    SEPSIS was considered but is NOT present in the emergency department as SIRS criteria is not present.      Consultants/Shared Management Plan:    SHARED VISIT: I have discussed the case with my supervising physician, Dr. Garcia who states based on location of the foreign body in the foot, unlikely to be able removed at bedside.  Recommends follow-up with podiatry outpatient, short course of Keflex, update tetanus, and pain management.. The substantive portion of the medical decision was made by the attesting physician who made or approve the management plan and will take responsibility for the patient.  Clinical findings were discussed and ultimate disposition was made in consult with supervising physician.    Social Determinants of Health:    Patient has presented with family members who are responsible, reliable and will ensure follow up care.      Disposition and Care Coordination:    Discharged: I considered escalation of care by admitting this patient to the hospital,  however patient does not meet sepsis/SIRS criteria.  Patient is present with  will be able to provide transport today.    I have explained the patient´s condition, diagnoses and treatment plan based on the information available to me at this time. I have answered questions and addressed any concerns. The patient has a good  understanding of the patient´s diagnosis, condition, and treatment plan as can be expected at this point. The vital signs have been stable. The patient´s condition is stable and appropriate for discharge from the emergency department.      The patient will pursue further outpatient evaluation with the primary care physician or other designated or consulting physician as outlined in the discharge instructions. They are agreeable to this plan of care and follow-up instructions have been explained in detail. The patient has received these instructions in written format and has expressed an understanding of the discharge instructions. The patient is aware that any significant change in condition or worsening of symptoms should prompt an immediate return to this or the closest emergency department or call to 911.  I have explained discharge medications and the need for follow up with the patient/caretakers. This was also printed in the discharge instructions. Patient was discharged with the following medications and follow up:      Medication List        New Prescriptions      cephalexin 500 MG capsule  Commonly known as: KEFLEX  Take 1 capsule by mouth 2 (Two) Times a Day.     HYDROcodone-acetaminophen 7.5-325 MG per tablet  Commonly known as: NORCO  Take 1 tablet by mouth Every 6 (Six) Hours As Needed for Moderate Pain.               Where to Get Your Medications        These medications were sent to [facility]/pharmacy #[id number] - [place], [place] - [address] - [phone] [facility] [phone]   [address] [place] [address] [place] [address]      Hours: 24-hours Phone: [phone]   cephalexin 500  MG capsule  HYDROcodone-acetaminophen 7.5-325 MG per tablet      Barrett Bradley MD  [address] [id number] [phone]    Call   If symptoms worsen, As needed    Jorge Kaye DPM  [address] [id number] [phone]             Final diagnoses:   Foreign body in left foot, initial encounter        ED Disposition       ED Disposition   Discharge    Condition   Stable    Comment   --               This medical record created using voice recognition software.             Rodrigo Dai PA-C  10/20/24 3065

## 2024-10-15 NOTE — ED PROVIDER NOTES
"SHARED VISIT NOTE:    Patient is 54 y.o. year old female that presents to the ED for evaluation of foreign body left foot..     Physical Exam    ED Course:    /100 (BP Location: Right arm, Patient Position: Sitting)   Pulse 98   Temp 97.9 °F (36.6 °C) (Oral)   Resp 20   Ht 154.9 cm (61\")   Wt 56 kg (123 lb 7.3 oz)   SpO2 98%   BMI 23.33 kg/m²   Results for orders placed or performed during the hospital encounter of 08/15/24   POC Urinalysis Dipstick, Multipro (All Except Lew UCs)    Collection Time: 08/15/24  1:40 PM    Specimen: Urine   Result Value Ref Range    Color Dark Yellow     Clarity, UA Clear     Glucose, UA Negative mg/dL    Bilirubin Negative     Ketones, UA Negative     Specific Gravity  1.020 1.005 - 1.030    Blood, UA Negative     pH, Urine 6.0 5.0 - 8.0    Protein, POC Trace (A) mg/dL    Urobilinogen, UA 0.2 E.U./dL     Nitrite, UA Negative     Leukocytes Negative      Medications   Tetanus-Diphth-Acell Pertussis (BOOSTRIX) injection 0.5 mL (has no administration in time range)     No results found.    MDM:    Procedures              SHARED VISIT ATTESTATION:    This visit was performed by both myself and an APC.  I performed the substantive portion of the medical decision making. The management plan was made or approved by me, and I take responsibility for patient management.           Sarthak Garcia DO  17:59 EDT  10/15/24         Sarthak Garcia DO  10/15/24 2143    "

## 2024-10-17 ENCOUNTER — OFFICE VISIT (OUTPATIENT)
Dept: PODIATRY | Facility: CLINIC | Age: 54
End: 2024-10-17
Payer: COMMERCIAL

## 2024-10-17 VITALS
HEIGHT: 61 IN | HEART RATE: 85 BPM | SYSTOLIC BLOOD PRESSURE: 155 MMHG | BODY MASS INDEX: 23.41 KG/M2 | WEIGHT: 124 LBS | DIASTOLIC BLOOD PRESSURE: 99 MMHG | TEMPERATURE: 97.8 F | OXYGEN SATURATION: 97 %

## 2024-10-17 DIAGNOSIS — M79.672 FOOT PAIN, LEFT: ICD-10-CM

## 2024-10-17 DIAGNOSIS — S90.852A FOREIGN BODY IN LEFT FOOT, INITIAL ENCOUNTER: Primary | ICD-10-CM

## 2024-10-17 RX ORDER — CYCLOBENZAPRINE HCL 10 MG
TABLET ORAL
COMMUNITY
Start: 2024-10-07

## 2024-10-17 NOTE — LETTER
October 17, 2024     Sarthak Garcia DO  [address] [place] [address]    Patient: Vega Correa   YOB: 1970   Date of Visit: 10/17/2024       Dear Sarthak Garcia DO:    Thank you for referring Vega Correa to me for evaluation. Below are the relevant portions of my assessment and plan of care.    Encounter Diagnosis and Orders:  Diagnoses and all orders for this visit:    1. Foreign body in left foot, initial encounter (Primary)  -     Case Request; Standing  -     ceFAZolin (ANCEF) 2 g in sodium chloride 0.9 % 100 mL IVPB  -     Case Request    2. Foot pain, left    Other orders  -     Follow Anesthesia Guidelines / Protocol; Future  -     Follow Anesthesia Guidelines / Protocol; Standing  -     Verify NPO Status; Standing        If you have questions, please do not hesitate to call me. I look forward to following Vega along with you.         Sincerely,        Jorge Kaye DPM        CC: No Recipients

## 2024-10-17 NOTE — PROGRESS NOTES
[facility] - PODIATRY    Today's Date: 10/17/24    Patient Name: Vega Correa  MRN: [id number]  CSN: [id number]  PCP: Barrett Bradley MD  Referring Provider: Sarthak Garcia DO    SUBJECTIVE     Chief Complaint   Patient presents with    Left Foot - Establish Care, Pain     Stepped on a needle while cleaning her house  Seen at [facility] ER 10/15/24 exam xrays and referred here. RX Keflex and Hydrocodone     HPI: Vega Correa, a 54 y.o.female, presents to clinic.    New, Established, New Problem:  new    Location: Left medial arch    Duration: 15 October 2024    Onset: Acute, she was cleaning her house barefoot    Nature: Sore, sharp    Aggravating factors:  Patient relates pain is aggravated by shoe gear and ambulation.      Previous Treatment: ER visit, x-ray,, prescribed Keflex and pain medication    Patient denies any fevers, chills, nausea, vomiting, shortness of breath, nor any other constitutional signs nor symptoms.    No other pedal complaints at this time.      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    Depression     Drug abuse and dependence     Headache     Hypertension     Night sweats     Shortness of breath      Past Surgical History:   Procedure Laterality Date    CHOLECYSTECTOMY      TONSILLECTOMY      TUBAL ABDOMINAL LIGATION       Family History   Problem Relation Age of Onset    Depression Mother     Bipolar disorder Mother     Anxiety disorder Mother     Alcohol abuse Father     COPD Father     Depression Sister     No Known Problems Brother     No Known Problems Maternal Aunt     No Known Problems Paternal Aunt     No Known Problems Maternal Uncle     No Known Problems Paternal Uncle     Heart attack Maternal Grandfather     Depression Maternal Grandmother     Bipolar disorder Maternal Grandmother     No Known Problems Paternal Grandfather     No Known Problems Paternal Grandmother     No  Known Problems Cousin     Depression Daughter     Bipolar disorder Daughter     Anxiety disorder Daughter     ADD / ADHD Daughter     ADD / ADHD Nephew     Dementia Neg Hx     Drug abuse Neg Hx     OCD Neg Hx     Paranoid behavior Neg Hx     Schizophrenia Neg Hx     Seizures Neg Hx     Self-Injurious Behavior  Neg Hx     Suicide Attempts Neg Hx      Social History     Socioeconomic History    Marital status:     Number of children: 2    Highest education level: Some college, no degree   Tobacco Use    Smoking status: Heavy Smoker     Current packs/day: 0.50     Types: Cigarettes     Passive exposure: Current    Smokeless tobacco: Never   Vaping Use    Vaping status: Every Day    Substances: Nicotine, Flavoring    Devices: Disposable   Substance and Sexual Activity    Alcohol use: Not Currently    Drug use: Not Currently     Types: Methamphetamines     Comment: CLEAN SINCE MARCH 2021    Sexual activity: Yes     Partners: Male     Birth control/protection: None, Tubal ligation     No Known Allergies  Current Outpatient Medications   Medication Sig Dispense Refill    busPIRone (BUSPAR) 10 MG tablet Take 1 tablet by mouth 3 times a day.      cephalexin (KEFLEX) 500 MG capsule Take 1 capsule by mouth 2 (Two) Times a Day. 10 capsule 0    citalopram (CeleXA) 20 MG tablet Take 1 tablet by mouth Daily.      cyclobenzaprine (FLEXERIL) 10 MG tablet TAKE 1 TABLET BY MOUTH THREE TIMES DAILY FOR UP TO 5 DAYS AS NEEDED FOR MUSCLE SPASMS      diclofenac (VOLTAREN) 75 MG EC tablet Take 1 tablet by mouth Every 12 (Twelve) Hours.      HYDROcodone-acetaminophen (NORCO) 7.5-325 MG per tablet Take 1 tablet by mouth Every 6 (Six) Hours As Needed for Moderate Pain. 12 tablet 0    lisinopril (PRINIVIL,ZESTRIL) 20 MG tablet Take 1 tablet by mouth Daily.      prazosin (MINIPRESS) 1 MG capsule Take 1 capsule by mouth Every Night. 30 capsule 1    rOPINIRole (REQUIP) 1 MG tablet Take 1 tablet by mouth Every Night.      traZODone  (DESYREL) 50 MG tablet Take 1 tablet by mouth At Night As Needed.      venlafaxine XR (EFFEXOR-XR) 150 MG 24 hr capsule Take 1 capsule by mouth Daily.       No current facility-administered medications for this visit.     Review of Systems   Constitutional: Negative.    Musculoskeletal:         Foreign body in left lateral midfoot.   All other systems reviewed and are negative.      OBJECTIVE     Vitals:    10/17/24 1249   BP: 155/99   Pulse: 85   Temp: 97.8 °F (36.6 °C)   SpO2: 97%       WBC   Date Value Ref Range Status   03/01/2023 7.12 3.40 - 10.80 10*3/mm3 Final     RBC   Date Value Ref Range Status   03/01/2023 4.60 3.77 - 5.28 10*6/mm3 Final     Hemoglobin   Date Value Ref Range Status   03/01/2023 13.7 12.0 - 15.9 g/dL Final     Hematocrit   Date Value Ref Range Status   03/01/2023 40.1 34.0 - 46.6 % Final     MCV   Date Value Ref Range Status   03/01/2023 87.2 79.0 - 97.0 fL Final     MCH   Date Value Ref Range Status   03/01/2023 29.8 26.6 - 33.0 pg Final     MCHC   Date Value Ref Range Status   03/01/2023 34.2 31.5 - 35.7 g/dL Final     RDW   Date Value Ref Range Status   03/01/2023 12.3 12.3 - 15.4 % Final     RDW-SD   Date Value Ref Range Status   03/01/2023 39.3 37.0 - 54.0 fl Final     MPV   Date Value Ref Range Status   03/01/2023 9.8 6.0 - 12.0 fL Final     Platelets   Date Value Ref Range Status   03/01/2023 289 140 - 450 10*3/mm3 Final     Neutrophil %   Date Value Ref Range Status   03/01/2023 57.8 42.7 - 76.0 % Final     Lymphocyte %   Date Value Ref Range Status   03/01/2023 27.4 19.6 - 45.3 % Final     Monocyte %   Date Value Ref Range Status   03/01/2023 9.0 5.0 - 12.0 % Final     Eosinophil %   Date Value Ref Range Status   03/01/2023 4.8 0.3 - 6.2 % Final     Basophil %   Date Value Ref Range Status   03/01/2023 0.7 0.0 - 1.5 % Final     Immature Grans %   Date Value Ref Range Status   03/01/2023 0.3 0.0 - 0.5 % Final     Neutrophils, Absolute   Date Value Ref Range Status   03/01/2023 4.12  1.70 - 7.00 10*3/mm3 Final     Lymphocytes, Absolute   Date Value Ref Range Status   03/01/2023 1.95 0.70 - 3.10 10*3/mm3 Final     Monocytes, Absolute   Date Value Ref Range Status   03/01/2023 0.64 0.10 - 0.90 10*3/mm3 Final     Eosinophils, Absolute   Date Value Ref Range Status   03/01/2023 0.34 0.00 - 0.40 10*3/mm3 Final     Basophils, Absolute   Date Value Ref Range Status   03/01/2023 0.05 0.00 - 0.20 10*3/mm3 Final     Immature Grans, Absolute   Date Value Ref Range Status   03/01/2023 0.02 0.00 - 0.05 10*3/mm3 Final     nRBC   Date Value Ref Range Status   03/01/2023 0.0 0.0 - 0.2 /100 WBC Final         Lab Results   Component Value Date    GLUCOSE 88 03/01/2023    BUN 20 03/01/2023    CREATININE 0.96 03/01/2023     03/01/2023    K 4.8 03/01/2023    CL 98 03/01/2023    CALCIUM 10.2 03/01/2023    PROTEINTOT 7.9 03/01/2023    ALBUMIN 4.7 03/01/2023     (H) 03/01/2023    AST 69 (H) 03/01/2023    ALKPHOS 77 03/01/2023    BILITOT 0.3 03/01/2023    GLOB 3.2 03/01/2023    AGRATIO 1.5 03/01/2023    BCR 20.8 03/01/2023    ANIONGAP 9.8 03/01/2023    EGFR 72.2 03/01/2023       Patient seen in no apparent distress.      PHYSICAL EXAM:     Foot/Ankle Exam    GENERAL  Appearance:  appears stated age  Orientation:  AAOx3  Affect:  appropriate  Gait:  unimpaired  Assistance:  independent  Right shoe gear: sandal  Left shoe gear: sandal    VASCULAR     Right Foot Vascularity   Normal vascular exam    Dorsalis pedis:  2+  Posterior tibial:  2+  Skin temperature:  warm  Edema grading:  None  CFT:  < 3 seconds  Pedal hair growth:  Present  Varicosities:  none     Left Foot Vascularity   Normal vascular exam    Dorsalis pedis:  2+  Posterior tibial:  2+  Skin temperature:  warm  Edema grading:  None  CFT:  < 3 seconds  Pedal hair growth:  Present  Varicosities:  none     NEUROLOGIC     Right Foot Neurologic   Normal sensation    Light touch sensation: normal  Vibratory sensation: normal  Hot/Cold sensation:  normal  Protective Sensation using [place] Monofilament:   Sites intact: 10  Sites tested: 10     Left Foot Neurologic   Normal sensation    Light touch sensation: normal  Vibratory sensation: normal  Hot/Cold sensation:  normal  Protective Sensation using [place] Monofilament:   Sites intact: 10  Sites tested: 10    MUSCULOSKELETAL     Left Foot Musculoskeletal   Tenderness:  (Left arch)    MUSCLE STRENGTH     Right Foot Muscle Strength   Foot dorsiflexion:  4  Foot plantar flexion:  4  Foot inversion:  4  Foot eversion:  4     Left Foot Muscle Strength   Foot dorsiflexion:  4  Foot plantar flexion:  4  Foot inversion:  4  Foot eversion:  4    RANGE OF MOTION     Right Foot Range of Motion   Foot and ankle ROM within normal limits       Left Foot Range of Motion   Foot and ankle ROM within normal limits      DERMATOLOGIC      Right Foot Dermatologic   Skin  Right foot skin is intact.      Left Foot Dermatologic   Skin  Left foot skin is intact.      Left foot additional comments: Puncture wound in left lateral midfoot.      RADIOLOGY:      XR Foot 3+ View Left    Result Date: 10/15/2024  Narrative: XR FOOT 3+ VW LEFT Date of Exam: 10/15/2024 5:40 PM EDT Indication: PT may have a sewing needle embedded in bottom of left foot. Comparison: None available. Findings: The distal tibia and fibula are intact. The talus and calcaneus are intact. Calcaneal spur. The tarsal and metatarsal bones are intact. The phalanges are intact. Bony alignment is normal. No lytic or blastic disease. 1.2 cm linear metallic density within the plantar aspect of the foot involving the soft tissues.     Impression: Linear metallic foreign body within the plantar aspect of the foot embedded within the soft tissues. Electronically Signed: Sathish Mayo MD  10/15/2024 6:20 PM EDT  Workstation ID: [id number]     ASSESSMENT/PLAN     Diagnoses and all orders for this visit:    1. Foreign body in left foot, initial encounter  (Primary)  -     Case Request; Standing  -     ceFAZolin (ANCEF) 2 g in sodium chloride 0.9 % 100 mL IVPB  -     Case Request    2. Foot pain, left    Other orders  -     Follow Anesthesia Guidelines / Protocol; Future  -     Follow Anesthesia Guidelines / Protocol; Standing  -     Verify NPO Status; Standing        Comprehensive lower extremity examination and evaluation was performed.    Discussed findings and treatment plan including risks, benefits, and treatment options with patient in detail. Patient agreed with treatment plan.    Medications and allergies reviewed.  Reviewed available lab values along with other pertinent labs.  These were discussed with the patient.    Planned surgery: Left lateral foot    The risks and benefits of the surgery were discussed with the patient.  This discussion included possible complications of requiring further surgery, possible delayed wound healing, further surgery requiring amputation of the foot or leg, and also included the complication of death.  All the patient's questions were answered to their satisfaction.  Patient states they would like to proceed with the procedure.    Discussed with the patient at length surgical versus nonsurgical options.  Explained to the patient in detail that surgical intervention is only indicated when the level of pain is such that it negatively affects her daily life.    It was explained to the patient that surgical interventions often times do not relieve all of the pain associated with the deformity    Risks and complications associated with surgical versus nonsurgical options were explained.  The patient understands that the problem will most likely continue to evolve and surgical intervention is the only treatment plan that actually corrects the deformities.    Upon discussion of non-surgical conservative option, surgical correction, post-operative requirements along with risk and benefits of the surgery along with expected outcomes,  the patient states they would like to proceed with the scheduling surgery.      The patient has decided to move forward with surgical intervention understanding all of these risks and complications.  An After Visit Summary was printed and given to the patient at discharge, including (if requested) any available informative/educational handouts regarding diagnosis, treatment, or medications. All questions were answered to patient/family satisfaction. Should symptoms fail to improve or worsen they agree to call or return to clinic or to go to the Emergency Department. Discussed the importance of following up with any needed screening tests/labs/specialist appointments and any requested follow-up recommended by me today. Importance of maintaining follow-up discussed and patient accepts that missed appointments can delay diagnosis and potentially lead to worsening of conditions.    Return for Post Operative., or sooner if acute issues arise.    This document has been electronically signed by Jorge Kaye DPM on October 17, 2024 13:21 EDT

## 2024-10-17 NOTE — H&P (VIEW-ONLY)
[facility] - PODIATRY    Today's Date: 10/17/24    Patient Name: Vega Correa  MRN: [id number]  CSN: [id number]  PCP: Barrett Bradley MD  Referring Provider: Sarthak Garcia DO    SUBJECTIVE     Chief Complaint   Patient presents with    Left Foot - Establish Care, Pain     Stepped on a needle while cleaning her house  Seen at [facility] ER 10/15/24 exam xrays and referred here. RX Keflex and Hydrocodone     HPI: Vega Correa, a 54 y.o.female, presents to clinic.    New, Established, New Problem:  new    Location: Left medial arch    Duration: 15 October 2024    Onset: Acute, she was cleaning her house barefoot    Nature: Sore, sharp    Aggravating factors:  Patient relates pain is aggravated by shoe gear and ambulation.      Previous Treatment: ER visit, x-ray,, prescribed Keflex and pain medication    Patient denies any fevers, chills, nausea, vomiting, shortness of breath, nor any other constitutional signs nor symptoms.    No other pedal complaints at this time.      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    Depression     Drug abuse and dependence     Headache     Hypertension     Night sweats     Shortness of breath      Past Surgical History:   Procedure Laterality Date    CHOLECYSTECTOMY      TONSILLECTOMY      TUBAL ABDOMINAL LIGATION       Family History   Problem Relation Age of Onset    Depression Mother     Bipolar disorder Mother     Anxiety disorder Mother     Alcohol abuse Father     COPD Father     Depression Sister     No Known Problems Brother     No Known Problems Maternal Aunt     No Known Problems Paternal Aunt     No Known Problems Maternal Uncle     No Known Problems Paternal Uncle     Heart attack Maternal Grandfather     Depression Maternal Grandmother     Bipolar disorder Maternal Grandmother     No Known Problems Paternal Grandfather     No Known Problems Paternal Grandmother     No  Known Problems Cousin     Depression Daughter     Bipolar disorder Daughter     Anxiety disorder Daughter     ADD / ADHD Daughter     ADD / ADHD Nephew     Dementia Neg Hx     Drug abuse Neg Hx     OCD Neg Hx     Paranoid behavior Neg Hx     Schizophrenia Neg Hx     Seizures Neg Hx     Self-Injurious Behavior  Neg Hx     Suicide Attempts Neg Hx      Social History     Socioeconomic History    Marital status:     Number of children: 2    Highest education level: Some college, no degree   Tobacco Use    Smoking status: Heavy Smoker     Current packs/day: 0.50     Types: Cigarettes     Passive exposure: Current    Smokeless tobacco: Never   Vaping Use    Vaping status: Every Day    Substances: Nicotine, Flavoring    Devices: Disposable   Substance and Sexual Activity    Alcohol use: Not Currently    Drug use: Not Currently     Types: Methamphetamines     Comment: CLEAN SINCE MARCH 2021    Sexual activity: Yes     Partners: Male     Birth control/protection: None, Tubal ligation     No Known Allergies  Current Outpatient Medications   Medication Sig Dispense Refill    busPIRone (BUSPAR) 10 MG tablet Take 1 tablet by mouth 3 times a day.      cephalexin (KEFLEX) 500 MG capsule Take 1 capsule by mouth 2 (Two) Times a Day. 10 capsule 0    citalopram (CeleXA) 20 MG tablet Take 1 tablet by mouth Daily.      cyclobenzaprine (FLEXERIL) 10 MG tablet TAKE 1 TABLET BY MOUTH THREE TIMES DAILY FOR UP TO 5 DAYS AS NEEDED FOR MUSCLE SPASMS      diclofenac (VOLTAREN) 75 MG EC tablet Take 1 tablet by mouth Every 12 (Twelve) Hours.      HYDROcodone-acetaminophen (NORCO) 7.5-325 MG per tablet Take 1 tablet by mouth Every 6 (Six) Hours As Needed for Moderate Pain. 12 tablet 0    lisinopril (PRINIVIL,ZESTRIL) 20 MG tablet Take 1 tablet by mouth Daily.      prazosin (MINIPRESS) 1 MG capsule Take 1 capsule by mouth Every Night. 30 capsule 1    rOPINIRole (REQUIP) 1 MG tablet Take 1 tablet by mouth Every Night.      traZODone  (DESYREL) 50 MG tablet Take 1 tablet by mouth At Night As Needed.      venlafaxine XR (EFFEXOR-XR) 150 MG 24 hr capsule Take 1 capsule by mouth Daily.       No current facility-administered medications for this visit.     Review of Systems   Constitutional: Negative.    Musculoskeletal:         Foreign body in left lateral midfoot.   All other systems reviewed and are negative.      OBJECTIVE     Vitals:    10/17/24 1249   BP: 155/99   Pulse: 85   Temp: 97.8 °F (36.6 °C)   SpO2: 97%       WBC   Date Value Ref Range Status   03/01/2023 7.12 3.40 - 10.80 10*3/mm3 Final     RBC   Date Value Ref Range Status   03/01/2023 4.60 3.77 - 5.28 10*6/mm3 Final     Hemoglobin   Date Value Ref Range Status   03/01/2023 13.7 12.0 - 15.9 g/dL Final     Hematocrit   Date Value Ref Range Status   03/01/2023 40.1 34.0 - 46.6 % Final     MCV   Date Value Ref Range Status   03/01/2023 87.2 79.0 - 97.0 fL Final     MCH   Date Value Ref Range Status   03/01/2023 29.8 26.6 - 33.0 pg Final     MCHC   Date Value Ref Range Status   03/01/2023 34.2 31.5 - 35.7 g/dL Final     RDW   Date Value Ref Range Status   03/01/2023 12.3 12.3 - 15.4 % Final     RDW-SD   Date Value Ref Range Status   03/01/2023 39.3 37.0 - 54.0 fl Final     MPV   Date Value Ref Range Status   03/01/2023 9.8 6.0 - 12.0 fL Final     Platelets   Date Value Ref Range Status   03/01/2023 289 140 - 450 10*3/mm3 Final     Neutrophil %   Date Value Ref Range Status   03/01/2023 57.8 42.7 - 76.0 % Final     Lymphocyte %   Date Value Ref Range Status   03/01/2023 27.4 19.6 - 45.3 % Final     Monocyte %   Date Value Ref Range Status   03/01/2023 9.0 5.0 - 12.0 % Final     Eosinophil %   Date Value Ref Range Status   03/01/2023 4.8 0.3 - 6.2 % Final     Basophil %   Date Value Ref Range Status   03/01/2023 0.7 0.0 - 1.5 % Final     Immature Grans %   Date Value Ref Range Status   03/01/2023 0.3 0.0 - 0.5 % Final     Neutrophils, Absolute   Date Value Ref Range Status   03/01/2023 4.12  1.70 - 7.00 10*3/mm3 Final     Lymphocytes, Absolute   Date Value Ref Range Status   03/01/2023 1.95 0.70 - 3.10 10*3/mm3 Final     Monocytes, Absolute   Date Value Ref Range Status   03/01/2023 0.64 0.10 - 0.90 10*3/mm3 Final     Eosinophils, Absolute   Date Value Ref Range Status   03/01/2023 0.34 0.00 - 0.40 10*3/mm3 Final     Basophils, Absolute   Date Value Ref Range Status   03/01/2023 0.05 0.00 - 0.20 10*3/mm3 Final     Immature Grans, Absolute   Date Value Ref Range Status   03/01/2023 0.02 0.00 - 0.05 10*3/mm3 Final     nRBC   Date Value Ref Range Status   03/01/2023 0.0 0.0 - 0.2 /100 WBC Final         Lab Results   Component Value Date    GLUCOSE 88 03/01/2023    BUN 20 03/01/2023    CREATININE 0.96 03/01/2023     03/01/2023    K 4.8 03/01/2023    CL 98 03/01/2023    CALCIUM 10.2 03/01/2023    PROTEINTOT 7.9 03/01/2023    ALBUMIN 4.7 03/01/2023     (H) 03/01/2023    AST 69 (H) 03/01/2023    ALKPHOS 77 03/01/2023    BILITOT 0.3 03/01/2023    GLOB 3.2 03/01/2023    AGRATIO 1.5 03/01/2023    BCR 20.8 03/01/2023    ANIONGAP 9.8 03/01/2023    EGFR 72.2 03/01/2023       Patient seen in no apparent distress.      PHYSICAL EXAM:     Foot/Ankle Exam    GENERAL  Appearance:  appears stated age  Orientation:  AAOx3  Affect:  appropriate  Gait:  unimpaired  Assistance:  independent  Right shoe gear: sandal  Left shoe gear: sandal    VASCULAR     Right Foot Vascularity   Normal vascular exam    Dorsalis pedis:  2+  Posterior tibial:  2+  Skin temperature:  warm  Edema grading:  None  CFT:  < 3 seconds  Pedal hair growth:  Present  Varicosities:  none     Left Foot Vascularity   Normal vascular exam    Dorsalis pedis:  2+  Posterior tibial:  2+  Skin temperature:  warm  Edema grading:  None  CFT:  < 3 seconds  Pedal hair growth:  Present  Varicosities:  none     NEUROLOGIC     Right Foot Neurologic   Normal sensation    Light touch sensation: normal  Vibratory sensation: normal  Hot/Cold sensation:  normal  Protective Sensation using [place] Monofilament:   Sites intact: 10  Sites tested: 10     Left Foot Neurologic   Normal sensation    Light touch sensation: normal  Vibratory sensation: normal  Hot/Cold sensation:  normal  Protective Sensation using [place] Monofilament:   Sites intact: 10  Sites tested: 10    MUSCULOSKELETAL     Left Foot Musculoskeletal   Tenderness:  (Left arch)    MUSCLE STRENGTH     Right Foot Muscle Strength   Foot dorsiflexion:  4  Foot plantar flexion:  4  Foot inversion:  4  Foot eversion:  4     Left Foot Muscle Strength   Foot dorsiflexion:  4  Foot plantar flexion:  4  Foot inversion:  4  Foot eversion:  4    RANGE OF MOTION     Right Foot Range of Motion   Foot and ankle ROM within normal limits       Left Foot Range of Motion   Foot and ankle ROM within normal limits      DERMATOLOGIC      Right Foot Dermatologic   Skin  Right foot skin is intact.      Left Foot Dermatologic   Skin  Left foot skin is intact.      Left foot additional comments: Puncture wound in left lateral midfoot.      RADIOLOGY:      XR Foot 3+ View Left    Result Date: 10/15/2024  Narrative: XR FOOT 3+ VW LEFT Date of Exam: 10/15/2024 5:40 PM EDT Indication: PT may have a sewing needle embedded in bottom of left foot. Comparison: None available. Findings: The distal tibia and fibula are intact. The talus and calcaneus are intact. Calcaneal spur. The tarsal and metatarsal bones are intact. The phalanges are intact. Bony alignment is normal. No lytic or blastic disease. 1.2 cm linear metallic density within the plantar aspect of the foot involving the soft tissues.     Impression: Linear metallic foreign body within the plantar aspect of the foot embedded within the soft tissues. Electronically Signed: Sathish Mayo MD  10/15/2024 6:20 PM EDT  Workstation ID: [id number]     ASSESSMENT/PLAN     Diagnoses and all orders for this visit:    1. Foreign body in left foot, initial encounter  (Primary)  -     Case Request; Standing  -     ceFAZolin (ANCEF) 2 g in sodium chloride 0.9 % 100 mL IVPB  -     Case Request    2. Foot pain, left    Other orders  -     Follow Anesthesia Guidelines / Protocol; Future  -     Follow Anesthesia Guidelines / Protocol; Standing  -     Verify NPO Status; Standing        Comprehensive lower extremity examination and evaluation was performed.    Discussed findings and treatment plan including risks, benefits, and treatment options with patient in detail. Patient agreed with treatment plan.    Medications and allergies reviewed.  Reviewed available lab values along with other pertinent labs.  These were discussed with the patient.    Planned surgery: Left lateral foot    The risks and benefits of the surgery were discussed with the patient.  This discussion included possible complications of requiring further surgery, possible delayed wound healing, further surgery requiring amputation of the foot or leg, and also included the complication of death.  All the patient's questions were answered to their satisfaction.  Patient states they would like to proceed with the procedure.    Discussed with the patient at length surgical versus nonsurgical options.  Explained to the patient in detail that surgical intervention is only indicated when the level of pain is such that it negatively affects her daily life.    It was explained to the patient that surgical interventions often times do not relieve all of the pain associated with the deformity    Risks and complications associated with surgical versus nonsurgical options were explained.  The patient understands that the problem will most likely continue to evolve and surgical intervention is the only treatment plan that actually corrects the deformities.    Upon discussion of non-surgical conservative option, surgical correction, post-operative requirements along with risk and benefits of the surgery along with expected outcomes,  the patient states they would like to proceed with the scheduling surgery.      The patient has decided to move forward with surgical intervention understanding all of these risks and complications.  An After Visit Summary was printed and given to the patient at discharge, including (if requested) any available informative/educational handouts regarding diagnosis, treatment, or medications. All questions were answered to patient/family satisfaction. Should symptoms fail to improve or worsen they agree to call or return to clinic or to go to the Emergency Department. Discussed the importance of following up with any needed screening tests/labs/specialist appointments and any requested follow-up recommended by me today. Importance of maintaining follow-up discussed and patient accepts that missed appointments can delay diagnosis and potentially lead to worsening of conditions.    Return for Post Operative., or sooner if acute issues arise.    This document has been electronically signed by Jorge Kaye DPM on October 17, 2024 13:21 EDT

## 2024-10-17 NOTE — PRE-PROCEDURE INSTRUCTIONS
IMPORTANT INSTRUCTIONS - PRE-ADMISSION TESTING  DO NOT EAT OR CHEW anything after midnight the night before your procedure.    NPO AFTER MN EXCEPT SIPS OF WATER TO TAKE MEDICATIONS LISTED BELOW  Take the following medications the morning of your procedure with JUST A SIP OF WATER:  _BUSPIRONE, KEFLEX, CITALOPRAM, FLEXERIL IF NEEDED, HYDROCODONE IF NEEDED,______________________________________________________________________________________________________________________________________________________________________________________    DO NOT BRING your medications to the hospital with you, UNLESS something has changed since your PRE-Admission Testing appointment.  Hold all vitamins, supplements, and NSAIDS (Non- steroidal anti-inflammatory meds) for one week prior to surgery (you MAY take Tylenol or Acetaminophen).  If you are diabetic, check your blood sugar the morning of your procedure. If it is less than 70 or if you are feeling symptomatic, call the following number for further instructions: [phone] _______.  Use your inhalers/nebulizers as usual, the morning of your procedure. BRING YOUR INHALERS with you.   Bring your CPAP or BIPAP to hospital, ONLY IF YOU WILL BE SPENDING THE NIGHT.   Make sure you have a ride home and have someone who will stay with you the day of your procedure after you go home.  If you have any questions, please call your Pre-Admission Testing Nurse, ___LEISA_____________ at [phone]____________.   Per anesthesia request, do not smoke for 24 hours before your procedure or as instructed by your surgeon.    WILL CALL ON   10/17/24      NORMALLY BETWEEN 1 AND 4 PM TO GIVE OFFICIAL ARRIVAL TIME FOR DAY OF PROCEDURE  BATHING INSTRUCTIONS GIVEN. NO JEWELRY OF ANY TYPE OR NAIL POLISH UPPER OR LOWER EXT  COME TO ENTRANCE A, ELEVATOR A, 3RD FLOOR DAY OF PROCEDURE.  NO SMOKING 24 HOURS PRIOR TO PROCEDURE

## 2024-10-18 ENCOUNTER — ANESTHESIA (OUTPATIENT)
Dept: PERIOP | Facility: HOSPITAL | Age: 54
End: 2024-10-18
Payer: COMMERCIAL

## 2024-10-18 ENCOUNTER — ANESTHESIA EVENT (OUTPATIENT)
Dept: PERIOP | Facility: HOSPITAL | Age: 54
End: 2024-10-18
Payer: COMMERCIAL

## 2024-10-18 ENCOUNTER — APPOINTMENT (OUTPATIENT)
Dept: GENERAL RADIOLOGY | Facility: HOSPITAL | Age: 54
End: 2024-10-18
Payer: COMMERCIAL

## 2024-10-18 ENCOUNTER — HOSPITAL ENCOUNTER (OUTPATIENT)
Facility: HOSPITAL | Age: 54
Setting detail: HOSPITAL OUTPATIENT SURGERY
Discharge: HOME | End: 2024-10-18
Attending: PODIATRIST | Admitting: PODIATRIST
Payer: COMMERCIAL

## 2024-10-18 VITALS
OXYGEN SATURATION: 99 % | HEART RATE: 70 BPM | WEIGHT: 125.22 LBS | RESPIRATION RATE: 16 BRPM | HEIGHT: 61 IN | DIASTOLIC BLOOD PRESSURE: 92 MMHG | BODY MASS INDEX: 23.64 KG/M2 | SYSTOLIC BLOOD PRESSURE: 174 MMHG | TEMPERATURE: 97.5 F

## 2024-10-18 DIAGNOSIS — S90.852A FOREIGN BODY IN LEFT FOOT, INITIAL ENCOUNTER: ICD-10-CM

## 2024-10-18 PROCEDURE — 28192 REMOVAL OF FOOT FOREIGN BODY: CPT | Performed by: PODIATRIST

## 2024-10-18 PROCEDURE — 88300 SURGICAL PATH GROSS: CPT | Performed by: PODIATRIST

## 2024-10-18 PROCEDURE — 25010000002 ONDANSETRON PER 1 MG

## 2024-10-18 PROCEDURE — 25010000002 MIDAZOLAM PER 1MG: Performed by: ANESTHESIOLOGY

## 2024-10-18 PROCEDURE — 76000 FLUOROSCOPY <1 HR PHYS/QHP: CPT

## 2024-10-18 PROCEDURE — 25010000002 DEXAMETHASONE PER 1 MG

## 2024-10-18 PROCEDURE — 25010000002 HYDROMORPHONE 1 MG/ML SOLUTION

## 2024-10-18 PROCEDURE — 25010000002 LIDOCAINE PF 2% 2 % SOLUTION

## 2024-10-18 PROCEDURE — 25010000002 PROPOFOL 200 MG/20ML EMULSION

## 2024-10-18 PROCEDURE — 25810000003 LACTATED RINGERS PER 1000 ML: Performed by: ANESTHESIOLOGY

## 2024-10-18 PROCEDURE — 25010000002 CEFAZOLIN PER 500 MG: Performed by: PODIATRIST

## 2024-10-18 PROCEDURE — 25010000002 BUPIVACAINE (PF) 0.25 % SOLUTION: Performed by: PODIATRIST

## 2024-10-18 RX ORDER — ONDANSETRON 2 MG/ML
INJECTION INTRAMUSCULAR; INTRAVENOUS AS NEEDED
Status: DISCONTINUED | OUTPATIENT
Start: 2024-10-18 | End: 2024-10-18 | Stop reason: SURG

## 2024-10-18 RX ORDER — TRAMADOL HYDROCHLORIDE 50 MG/1
50 TABLET ORAL EVERY 8 HOURS PRN
Qty: 30 TABLET | Refills: 0 | Status: SHIPPED | OUTPATIENT
Start: 2024-10-18

## 2024-10-18 RX ORDER — MEPERIDINE HYDROCHLORIDE 25 MG/ML
12.5 INJECTION INTRAMUSCULAR; INTRAVENOUS; SUBCUTANEOUS
Status: DISCONTINUED | OUTPATIENT
Start: 2024-10-18 | End: 2024-10-18 | Stop reason: HOSPADM

## 2024-10-18 RX ORDER — SODIUM CHLORIDE, SODIUM LACTATE, POTASSIUM CHLORIDE, CALCIUM CHLORIDE 600; 310; 30; 20 MG/100ML; MG/100ML; MG/100ML; MG/100ML
20 INJECTION, SOLUTION INTRAVENOUS CONTINUOUS PRN
Status: DISCONTINUED | OUTPATIENT
Start: 2024-10-18 | End: 2024-10-18 | Stop reason: HOSPADM

## 2024-10-18 RX ORDER — DEXMEDETOMIDINE HYDROCHLORIDE 100 UG/ML
INJECTION, SOLUTION INTRAVENOUS AS NEEDED
Status: DISCONTINUED | OUTPATIENT
Start: 2024-10-18 | End: 2024-10-18 | Stop reason: SURG

## 2024-10-18 RX ORDER — LIDOCAINE HYDROCHLORIDE 20 MG/ML
INJECTION, SOLUTION EPIDURAL; INFILTRATION; INTRACAUDAL; PERINEURAL AS NEEDED
Status: DISCONTINUED | OUTPATIENT
Start: 2024-10-18 | End: 2024-10-18 | Stop reason: SURG

## 2024-10-18 RX ORDER — BUPIVACAINE HYDROCHLORIDE 2.5 MG/ML
INJECTION, SOLUTION EPIDURAL; INFILTRATION; INTRACAUDAL AS NEEDED
Status: DISCONTINUED | OUTPATIENT
Start: 2024-10-18 | End: 2024-10-18 | Stop reason: HOSPADM

## 2024-10-18 RX ORDER — PROPOFOL 10 MG/ML
INJECTION, EMULSION INTRAVENOUS AS NEEDED
Status: DISCONTINUED | OUTPATIENT
Start: 2024-10-18 | End: 2024-10-18 | Stop reason: SURG

## 2024-10-18 RX ORDER — PROMETHAZINE HYDROCHLORIDE 25 MG/1
25 SUPPOSITORY RECTAL ONCE AS NEEDED
Status: DISCONTINUED | OUTPATIENT
Start: 2024-10-18 | End: 2024-10-18 | Stop reason: HOSPADM

## 2024-10-18 RX ORDER — OXYCODONE HYDROCHLORIDE 5 MG/1
5 TABLET ORAL
Status: DISCONTINUED | OUTPATIENT
Start: 2024-10-18 | End: 2024-10-18 | Stop reason: HOSPADM

## 2024-10-18 RX ORDER — KETAMINE HCL IN NACL, ISO-OSM 100MG/10ML
SYRINGE (ML) INJECTION AS NEEDED
Status: DISCONTINUED | OUTPATIENT
Start: 2024-10-18 | End: 2024-10-18 | Stop reason: SURG

## 2024-10-18 RX ORDER — MIDAZOLAM HYDROCHLORIDE 2 MG/2ML
2 INJECTION, SOLUTION INTRAMUSCULAR; INTRAVENOUS ONCE
Status: COMPLETED | OUTPATIENT
Start: 2024-10-18 | End: 2024-10-18

## 2024-10-18 RX ORDER — ACETAMINOPHEN 500 MG
1000 TABLET ORAL ONCE
Status: COMPLETED | OUTPATIENT
Start: 2024-10-18 | End: 2024-10-18

## 2024-10-18 RX ORDER — ONDANSETRON 2 MG/ML
4 INJECTION INTRAMUSCULAR; INTRAVENOUS ONCE AS NEEDED
Status: DISCONTINUED | OUTPATIENT
Start: 2024-10-18 | End: 2024-10-18 | Stop reason: HOSPADM

## 2024-10-18 RX ORDER — PHENYLEPHRINE HCL IN 0.9% NACL 1 MG/10 ML
SYRINGE (ML) INTRAVENOUS AS NEEDED
Status: DISCONTINUED | OUTPATIENT
Start: 2024-10-18 | End: 2024-10-18 | Stop reason: SURG

## 2024-10-18 RX ORDER — EPHEDRINE SULFATE 50 MG/ML
10 INJECTION, SOLUTION INTRAVENOUS ONCE AS NEEDED
Status: DISCONTINUED | OUTPATIENT
Start: 2024-10-18 | End: 2024-10-18 | Stop reason: HOSPADM

## 2024-10-18 RX ORDER — PROMETHAZINE HYDROCHLORIDE 12.5 MG/1
25 TABLET ORAL ONCE AS NEEDED
Status: DISCONTINUED | OUTPATIENT
Start: 2024-10-18 | End: 2024-10-18 | Stop reason: HOSPADM

## 2024-10-18 RX ORDER — DEXAMETHASONE SODIUM PHOSPHATE 4 MG/ML
INJECTION, SOLUTION INTRA-ARTICULAR; INTRALESIONAL; INTRAMUSCULAR; INTRAVENOUS; SOFT TISSUE AS NEEDED
Status: DISCONTINUED | OUTPATIENT
Start: 2024-10-18 | End: 2024-10-18 | Stop reason: SURG

## 2024-10-18 RX ADMIN — MIDAZOLAM HYDROCHLORIDE 2 MG: 1 INJECTION, SOLUTION INTRAMUSCULAR; INTRAVENOUS at 14:44

## 2024-10-18 RX ADMIN — DEXMEDETOMIDINE HYDROCHLORIDE 8 MCG: 100 INJECTION, SOLUTION, CONCENTRATE INTRAVENOUS at 15:37

## 2024-10-18 RX ADMIN — Medication 100 MCG: at 15:11

## 2024-10-18 RX ADMIN — Medication 30 MG: at 15:10

## 2024-10-18 RX ADMIN — OXYCODONE 5 MG: 5 TABLET ORAL at 16:02

## 2024-10-18 RX ADMIN — Medication 100 MCG: at 15:32

## 2024-10-18 RX ADMIN — CEFAZOLIN 2 G: 2 INJECTION, POWDER, FOR SOLUTION INTRAVENOUS at 15:03

## 2024-10-18 RX ADMIN — Medication 100 MCG: at 15:24

## 2024-10-18 RX ADMIN — DEXMEDETOMIDINE HYDROCHLORIDE 8 MCG: 100 INJECTION, SOLUTION, CONCENTRATE INTRAVENOUS at 15:10

## 2024-10-18 RX ADMIN — LIDOCAINE HYDROCHLORIDE 40 MG: 20 INJECTION, SOLUTION EPIDURAL; INFILTRATION; INTRACAUDAL; PERINEURAL at 15:37

## 2024-10-18 RX ADMIN — ONDANSETRON 4 MG: 2 INJECTION INTRAMUSCULAR; INTRAVENOUS at 15:24

## 2024-10-18 RX ADMIN — HYDROMORPHONE HYDROCHLORIDE 0.5 MG: 1 INJECTION, SOLUTION INTRAMUSCULAR; INTRAVENOUS; SUBCUTANEOUS at 16:15

## 2024-10-18 RX ADMIN — DEXAMETHASONE SODIUM PHOSPHATE 8 MG: 4 INJECTION, SOLUTION INTRAMUSCULAR; INTRAVENOUS at 15:03

## 2024-10-18 RX ADMIN — ACETAMINOPHEN 1000 MG: 500 TABLET ORAL at 12:11

## 2024-10-18 RX ADMIN — SODIUM CHLORIDE, POTASSIUM CHLORIDE, SODIUM LACTATE AND CALCIUM CHLORIDE 20 ML/HR: 600; 310; 30; 20 INJECTION, SOLUTION INTRAVENOUS at 12:10

## 2024-10-18 RX ADMIN — DEXMEDETOMIDINE HYDROCHLORIDE 4 MCG: 100 INJECTION, SOLUTION, CONCENTRATE INTRAVENOUS at 15:16

## 2024-10-18 RX ADMIN — LIDOCAINE HYDROCHLORIDE 60 MG: 20 INJECTION, SOLUTION EPIDURAL; INFILTRATION; INTRACAUDAL; PERINEURAL at 14:55

## 2024-10-18 RX ADMIN — DEXMEDETOMIDINE HYDROCHLORIDE 8 MCG: 100 INJECTION, SOLUTION, CONCENTRATE INTRAVENOUS at 15:24

## 2024-10-18 RX ADMIN — Medication 100 MCG: at 15:14

## 2024-10-18 RX ADMIN — PROPOFOL 140 MG: 10 INJECTION, EMULSION INTRAVENOUS at 14:55

## 2024-10-18 RX ADMIN — DEXMEDETOMIDINE HYDROCHLORIDE 4 MCG: 100 INJECTION, SOLUTION, CONCENTRATE INTRAVENOUS at 15:32

## 2024-10-18 NOTE — DISCHARGE INSTRUCTIONS
DISCHARGE INSTRUCTIONS  PODIATRY SURGERY       For your surgery you had:  General anesthesia (you may have a sore throat for the first 24 hours)  Local anesthesia  You may experience dizziness, drowsiness, or light-headedness for several hours following surgery  Do not stay alone today or tonight.  Limit your activity for 24 hours.  Resume your diet slowly.  Follow whatever special dietary instructions you may have been given by the doctor.  You should not drive or operate machinery or drink alcohol for 24 hours or while you are taking pain medication.You should not sign any legally binding documents.  DO NOT TOUCH DRESSING.  You may shower: KEEP DRESSING DRY.  Sleep with the injured part elevated on a pillow.  Follow verbal instructions of your doctor.  Sit with the lower leg propped up on a footstool or chair with pillows.    SPECIAL INSTRUCTIONS:      Last dose of pain medication was given at:   OXYCODONE 5MG AT 4PM Ice bag to injured area for 72 hours.  Apply 20 minutes on - 20 minutes off.  Never place ice directly on skin or cast.     Bend knee and rotate ankle for 5 minutes every hour to support circulation.  DO NOT REMOVE DRESSING. KEEP DRESSING DRY. You may try to bathe in a tub, while keeping foot out of tub.  Small amount of bleeding through bandage may occur and is normal, unless it becomes heavy or persists, call office in this case.  Eat well balanced diet high in protein and vitamin c, may take supplemental vitamins and calcium. Drink plenty of fluids and get plenty of rest with foot elevated.  Use crutches, walker or cane for ambulation depending on weight bearing status. No driving until released by MD.      NOTIFY THE PHYSICIAN IF YOU EXPERIENCE:  Numbness of toes.  Inability to move toes.  Extreme coldness, paleness or blue dis-coloration of toes.  Excessive swelling of affected surgical site or swelling that causes the cast to rub or cut into skin.  Pain unrelieved by pain  medication  Nausea/vomiting not relieved by prescribed medication  Unable to urinate in 6 hours after surgery  Temperature greater than 101 degree Fahrenheit or chills  If unable to reach your doctor, please go to the closest emergency room         [facility] Post-Surgical Instructions    Go directly home and try to keep your feet elevated by sitting in the back seat of the car and placing your foot on the seat. Have your prescriptions filled immediately.    Elevate feet above the level of the heart by supporting your feet and legs with pillows. Lying on a couch with 3-4 pillows works well. Elevation helps reduce swelling and should be used whenever you are resting.    Place an ice bag covered with a towel on your ankle area and/or behind your knee for no longer than 20 minutes, then keep ice off of foot for at least 20 minutes. The best way to remember this is 20 minutes on, then 20 minutes off. Continue this for the first week while awake. Ice helps to reduce swelling and inflammation. Do NOT sleep with ice on your foot or leg.    To promote circulation and healing, bend your knee and rotate your foot and ankle for 5 minutes each hour. Dangle your feet down for 1-2 minutes at least once per hour, then continue to elevate.    Keep the bandages or cast clean and dry. Do NOT remove your bandages under any circumstances without consulting Dr. Kaye. You may bathe by hanging your foot outside of the tub, but DO NOT attempt to shower.    Take your pain medication only as directed. Avoid alcoholwhile taking medication. If you experience nausea or lightheadedness, remain lying down and contact the office. You may prefer to use aspirin Tylenol, Motrin or other over the counter pain reliever.    A small amount of bleeding through the bandage may occur and should not cause concern unless it becomes heavy or persists. If this happens, contact the office. Do not become alarmed if you notice a bruised  appearance to your feet or toes.    Eat a well-balanced diet high in protein and vitamin C. Take supplemental vitamins and calcium. Drink plenty of fluids and get plenty of rest with your feet elevated.    Blankets may be kept from irritating the surgical site by using a large cardboard box to cradle the covers over the feet.    Use of crutches, a walker, or a cane can be useful in public areas to protect your feet. Do not attempt to operate a motor vehicle until directed by Dr. Kaye.    Call the office if any of the following occur: bleeding that won't stop, injury to foot, fever, wet bandages, redness with throbbing, and pain unrelieved by medication.

## 2024-10-18 NOTE — ANESTHESIA PREPROCEDURE EVALUATION
Anesthesia Evaluation     Patient summary reviewed and Nursing notes reviewed   no history of anesthetic complications:   NPO Solid Status: > 8 hours  NPO Liquid Status: > 2 hours           Airway   Mallampati: I  TM distance: >3 FB  Neck ROM: full  No difficulty expected  Dental    (+) edentulous    Pulmonary - normal exam    breath sounds clear to auscultation  (+) ,shortness of breath  Cardiovascular - normal exam  Exercise tolerance: good (4-7 METS)    Rhythm: regular  Rate: normal    (+) hypertension      Neuro/Psych  (+) headaches, numbness, psychiatric history Anxiety and Depression  GI/Hepatic/Renal/Endo - negative ROS     Musculoskeletal     Abdominal    Substance History - negative use     OB/GYN negative ob/gyn ROS         Other   arthritis,     ROS/Med Hx Other: PAT Nursing Notes unavailable.                 Anesthesia Plan    ASA 2     general     (Patient understands anesthesia not responsible for dental damage.)  intravenous induction     Anesthetic plan, risks, benefits, and alternatives have been provided, discussed and informed consent has been obtained with: patient.    Use of blood products discussed with patient .    Plan discussed with CRNA.      CODE STATUS:

## 2024-10-18 NOTE — ANESTHESIA POSTPROCEDURE EVALUATION
Patient: Vega Correa    Procedure Summary       Date: 10/18/24 Room / Location: [facility] OR 01 / [facility] MAIN OR    Anesthesia Start: 1449 Anesthesia Stop: 1544    Procedure: FOREIGN BODY REMOVAL FOOT (Left: Foot) Diagnosis:       Foreign body in left foot, initial encounter      (Foreign body in left foot, initial encounter [S90.852A])    Surgeons: Jorge Kaye DPM Provider: Naty Hassan MD    Anesthesia Type: general ASA Status: 2            Anesthesia Type: general    Vitals  Vitals Value Taken Time   /82 10/18/24 1626   Temp 36.1 °C (96.9 °F) 10/18/24 1610   Pulse 62 10/18/24 1626   Resp 14 10/18/24 1625   SpO2 99 % 10/18/24 1626   Vitals shown include unfiled device data.        Post Anesthesia Care and Evaluation    Patient location during evaluation: bedside  Patient participation: complete - patient participated  Level of consciousness: awake  Pain management: adequate    Airway patency: patent  PONV Status: none  Cardiovascular status: acceptable and stable  Respiratory status: acceptable  Hydration status: acceptable

## 2024-10-18 NOTE — OP NOTE
PREOPERATIVE DIAGNOSES:  Foreign body left foot     PROCEDURES PERFORMED:  Complicated removal of deep foreign body from left foot    ANESTHESIA:  LMA     HEMOSTASIS:  None     ESTIMATED BLOOD LOSS:  None     SPECIMENS:  Metallic foreign body     DRAINS:  None     COMPLICATIONS:  None.     IMPLANTS:  None     SUTURES:  3-0 nylon     INJECTABLES:  10 mL of 0.25% Marcaine plain     DESCRIPTION OF PROCEDURE:  Written consent was obtained from the patient prior to any medications or sedation being administered.     Under mild sedation, the patient was brought to the operating room and placed on the operating table in the supine position.      Following IV anesthesia, local anesthesia was obtained around the lateral left midfoot utilizing a total of 10  mL of 0.25% Marcaine plain.      The foot was then scrubbed, prepped and draped in the usual aseptic manner.       Attention was directed to the plantar lateral aspect of the left foot.      Care was taken to identify and retract all vital and neurovascular structures.  All bleeders were ligated and cauterized as necessary.    With use of a 15 blade, a 1 cm incision was made at the puncture site on the plantar lateral aspect of the left foot.  A pair of hemostats was placed in the incision site.  With use of a C arm, the metallic foreign body was visualized and removed in toto.  C-arm verified no residual areas of foreign body are seen.  Foreign bodies consistent with description of a broken sewing needle     The wound was flushed with copious amounts of sterile normal saline.     The skin edges were approximated and coapted utilizing 3-0 nylon.     Upon completion of the procedure, capillary fill time assessed at the surgical site to toes 1 through 5 of the left foot noted to be immediate.     The surgical site was dressed with a sterile compressive dressings consisting of Adaptic, 4 x 4's, Kerlix, and Coban.     The patient tolerated the procedure and anesthesia well.  The patient was transferred to the recovery room with vital signs stable and vascular status intact to toes one through five on the surgical limb.      Following a period of postoperative monitoring, the patient will be discharged home, having been given written and oral postoperative instructions. The patient is to contact Dr. Kaye for postoperative followup care and if any problems should arise.

## 2024-10-22 ENCOUNTER — OFFICE VISIT (OUTPATIENT)
Dept: PODIATRY | Facility: CLINIC | Age: 54
End: 2024-10-22
Payer: COMMERCIAL

## 2024-10-22 VITALS
BODY MASS INDEX: 23.81 KG/M2 | OXYGEN SATURATION: 94 % | HEART RATE: 83 BPM | SYSTOLIC BLOOD PRESSURE: 163 MMHG | TEMPERATURE: 98 F | WEIGHT: 126 LBS | DIASTOLIC BLOOD PRESSURE: 108 MMHG

## 2024-10-22 DIAGNOSIS — S90.852A FOREIGN BODY IN LEFT FOOT, INITIAL ENCOUNTER: Primary | ICD-10-CM

## 2024-10-22 DIAGNOSIS — M79.672 FOOT PAIN, LEFT: ICD-10-CM

## 2024-10-22 LAB
CYTO UR: NORMAL
LAB AP CASE REPORT: NORMAL
LAB AP CLINICAL INFORMATION: NORMAL
PATH REPORT.FINAL DX SPEC: NORMAL
PATH REPORT.GROSS SPEC: NORMAL

## 2024-10-22 PROCEDURE — 99024 POSTOP FOLLOW-UP VISIT: CPT | Performed by: PODIATRIST

## 2024-10-22 NOTE — PROGRESS NOTES
[facility] - PODIATRY    Today's Date: 10/22/24    Patient Name: Vega Correa  MRN: [id number]  CSN: [id number]  PCP: Barrett Bradley MD  Referring Provider: No ref. provider found    SUBJECTIVE     Chief Complaint   Patient presents with    Left Foot - Post-op     Removal of foreign body left foot done 10/18/24     HPI: Vega Correa, a 54 y.o.female, presents to clinic.    Procedure: Foreign body removal from left foot  Date: 18 October 2024    Patient states they are doing well without complications.  Patient states they are following post-op instructions.  Patient states pain is controlled.      Patient denies any fevers, chills, nausea, vomiting, shortness of breathe, nor any other constitutional signs nor symptoms.      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    Depression     Drug abuse and dependence     SOBER SINCE MARCH 2021    Foreign body in left foot     Headache     Hypertension     HX OF NOT CURRENTLY ON ANY ANTIHYPERTENSIVE    Night sweats     Shortness of breath      Past Surgical History:   Procedure Laterality Date    CHOLECYSTECTOMY      FOREIGN BODY REMOVAL Left 10/18/2024    Procedure: FOREIGN BODY REMOVAL FOOT;  Surgeon: Jorge Kaye DPM;  Location: [facility] MAIN OR;  Service: Podiatry;  Laterality: Left;    TONSILLECTOMY      TUBAL ABDOMINAL LIGATION       Family History   Problem Relation Age of Onset    Depression Mother     Bipolar disorder Mother     Anxiety disorder Mother     Alcohol abuse Father     COPD Father     Depression Sister     No Known Problems Brother     No Known Problems Maternal Aunt     No Known Problems Paternal Aunt     No Known Problems Maternal Uncle     No Known Problems Paternal Uncle     Heart attack Maternal Grandfather     Depression Maternal Grandmother     Bipolar disorder Maternal Grandmother     No Known Problems Paternal Grandfather     No  Known Problems Paternal Grandmother     No Known Problems Cousin     Depression Daughter     Bipolar disorder Daughter     Anxiety disorder Daughter     ADD / ADHD Daughter     ADD / ADHD Nephew     Dementia Neg Hx     Drug abuse Neg Hx     OCD Neg Hx     Paranoid behavior Neg Hx     Schizophrenia Neg Hx     Seizures Neg Hx     Self-Injurious Behavior  Neg Hx     Suicide Attempts Neg Hx      Social History     Socioeconomic History    Marital status:     Number of children: 2    Highest education level: Some college, no degree   Tobacco Use    Smoking status: Every Day     Current packs/day: 0.50     Types: Cigarettes     Passive exposure: Current    Smokeless tobacco: Never    Tobacco comments:     Last smoked last pm   Vaping Use    Vaping status: Every Day    Substances: Nicotine, Flavoring    Devices: Disposable, last used couple days ago   Substance and Sexual Activity    Alcohol use: Not Currently    Drug use: Not Currently     Types: Methamphetamines     Comment: CLEAN SINCE MARCH 2021    Sexual activity: Defer     No Known Allergies  Current Outpatient Medications   Medication Sig Dispense Refill    busPIRone (BUSPAR) 10 MG tablet Take 1 tablet by mouth 3 times a day.      cephalexin (KEFLEX) 500 MG capsule Take 1 capsule by mouth 2 (Two) Times a Day. 10 capsule 0    citalopram (CeleXA) 20 MG tablet Take 1 tablet by mouth Daily.      cyclobenzaprine (FLEXERIL) 10 MG tablet TAKE 1 TABLET BY MOUTH THREE TIMES DAILY FOR UP TO 5 DAYS AS NEEDED FOR MUSCLE SPASMS      diclofenac (VOLTAREN) 75 MG EC tablet Take 1 tablet by mouth Every 12 (Twelve) Hours.      HYDROcodone-acetaminophen (NORCO) 7.5-325 MG per tablet Take 1 tablet by mouth Every 6 (Six) Hours As Needed for Moderate Pain. 12 tablet 0    rOPINIRole (REQUIP) 1 MG tablet Take 1 tablet by mouth Every Night.      traMADol (ULTRAM) 50 MG tablet Take 1 tablet by mouth Every 8 (Eight) Hours As Needed for Moderate Pain. 30 tablet 0     No current  facility-administered medications for this visit.     Review of Systems   Constitutional: Negative.    Musculoskeletal:         Follow-up for removal of foreign body in left lateral midfoot.   All other systems reviewed and are negative.      OBJECTIVE     Vitals:    10/22/24 1353   BP: (!) 163/108   Pulse: 83   Temp: 98 °F (36.7 °C)   SpO2: 94%       WBC   Date Value Ref Range Status   03/01/2023 7.12 3.40 - 10.80 10*3/mm3 Final     RBC   Date Value Ref Range Status   03/01/2023 4.60 3.77 - 5.28 10*6/mm3 Final     Hemoglobin   Date Value Ref Range Status   03/01/2023 13.7 12.0 - 15.9 g/dL Final     Hematocrit   Date Value Ref Range Status   03/01/2023 40.1 34.0 - 46.6 % Final     MCV   Date Value Ref Range Status   03/01/2023 87.2 79.0 - 97.0 fL Final     MCH   Date Value Ref Range Status   03/01/2023 29.8 26.6 - 33.0 pg Final     MCHC   Date Value Ref Range Status   03/01/2023 34.2 31.5 - 35.7 g/dL Final     RDW   Date Value Ref Range Status   03/01/2023 12.3 12.3 - 15.4 % Final     RDW-SD   Date Value Ref Range Status   03/01/2023 39.3 37.0 - 54.0 fl Final     MPV   Date Value Ref Range Status   03/01/2023 9.8 6.0 - 12.0 fL Final     Platelets   Date Value Ref Range Status   03/01/2023 289 140 - 450 10*3/mm3 Final     Neutrophil %   Date Value Ref Range Status   03/01/2023 57.8 42.7 - 76.0 % Final     Lymphocyte %   Date Value Ref Range Status   03/01/2023 27.4 19.6 - 45.3 % Final     Monocyte %   Date Value Ref Range Status   03/01/2023 9.0 5.0 - 12.0 % Final     Eosinophil %   Date Value Ref Range Status   03/01/2023 4.8 0.3 - 6.2 % Final     Basophil %   Date Value Ref Range Status   03/01/2023 0.7 0.0 - 1.5 % Final     Immature Grans %   Date Value Ref Range Status   03/01/2023 0.3 0.0 - 0.5 % Final     Neutrophils, Absolute   Date Value Ref Range Status   03/01/2023 4.12 1.70 - 7.00 10*3/mm3 Final     Lymphocytes, Absolute   Date Value Ref Range Status   03/01/2023 1.95 0.70 - 3.10 10*3/mm3 Final      Monocytes, Absolute   Date Value Ref Range Status   03/01/2023 0.64 0.10 - 0.90 10*3/mm3 Final     Eosinophils, Absolute   Date Value Ref Range Status   03/01/2023 0.34 0.00 - 0.40 10*3/mm3 Final     Basophils, Absolute   Date Value Ref Range Status   03/01/2023 0.05 0.00 - 0.20 10*3/mm3 Final     Immature Grans, Absolute   Date Value Ref Range Status   03/01/2023 0.02 0.00 - 0.05 10*3/mm3 Final     nRBC   Date Value Ref Range Status   03/01/2023 0.0 0.0 - 0.2 /100 WBC Final         Lab Results   Component Value Date    GLUCOSE 88 03/01/2023    BUN 20 03/01/2023    CREATININE 0.96 03/01/2023     03/01/2023    K 4.8 03/01/2023    CL 98 03/01/2023    CALCIUM 10.2 03/01/2023    PROTEINTOT 7.9 03/01/2023    ALBUMIN 4.7 03/01/2023     (H) 03/01/2023    AST 69 (H) 03/01/2023    ALKPHOS 77 03/01/2023    BILITOT 0.3 03/01/2023    GLOB 3.2 03/01/2023    AGRATIO 1.5 03/01/2023    BCR 20.8 03/01/2023    ANIONGAP 9.8 03/01/2023    EGFR 72.2 03/01/2023       Patient seen in no apparent distress.      PHYSICAL EXAM:     Foot/Ankle Exam    GENERAL  Appearance:  appears stated age  Orientation:  AAOx3  Affect:  appropriate  Gait:  unimpaired  Assistance:  independent  Right shoe gear: sandal  Left shoe gear: surgical shoe    VASCULAR     Right Foot Vascularity   Normal vascular exam    Dorsalis pedis:  2+  Posterior tibial:  2+  Skin temperature:  warm  Edema grading:  None  CFT:  < 3 seconds  Pedal hair growth:  Present  Varicosities:  none     Left Foot Vascularity   Normal vascular exam    Dorsalis pedis:  2+  Posterior tibial:  2+  Skin temperature:  warm  Edema grading:  None  CFT:  < 3 seconds  Pedal hair growth:  Present  Varicosities:  none     NEUROLOGIC     Right Foot Neurologic   Normal sensation    Light touch sensation: normal  Vibratory sensation: normal  Hot/Cold sensation: normal  Protective Sensation using [place] Monofilament:   Sites intact: 10  Sites tested: 10     Left Foot Neurologic    Normal sensation    Light touch sensation: normal  Vibratory sensation: normal  Hot/Cold sensation:  normal  Protective Sensation using [place]-Dexter Monofilament:   Sites intact: 10  Sites tested: 10    MUSCULOSKELETAL     Left Foot Musculoskeletal   Tenderness:  (Left arch)    MUSCLE STRENGTH     Right Foot Muscle Strength   Foot dorsiflexion:  4  Foot plantar flexion:  4  Foot inversion:  4  Foot eversion:  4     Left Foot Muscle Strength   Foot dorsiflexion:  4  Foot plantar flexion:  4  Foot inversion:  4  Foot eversion:  4    RANGE OF MOTION     Right Foot Range of Motion   Foot and ankle ROM within normal limits       Left Foot Range of Motion   Foot and ankle ROM within normal limits      DERMATOLOGIC      Right Foot Dermatologic   Skin  Right foot skin is intact.      Left Foot Dermatologic   Skin  Left foot skin is intact.      Left foot additional comments: Left foot shows dressing is dry and intact without signs of breakthrough.  Lateral aspect of left foot shows sutures intact with skin edges well-coapted with no signs of dehiscence.  Healthy surgical skin edges.  No drainage present.  No edema, erythema, calor, lymphangitis, nor signs of infection seen.      RADIOLOGY:      FL Surgery Fluoro    Result Date: 10/18/2024  Narrative: This procedure was auto-finalized with no dictation required.    XR Foot 3+ View Left    Result Date: 10/15/2024  Narrative: XR FOOT 3+ VW LEFT Date of Exam: 10/15/2024 5:40 PM EDT Indication: PT may have a sewing needle embedded in bottom of left foot. Comparison: None available. Findings: The distal tibia and fibula are intact. The talus and calcaneus are intact. Calcaneal spur. The tarsal and metatarsal bones are intact. The phalanges are intact. Bony alignment is normal. No lytic or blastic disease. 1.2 cm linear metallic density within the plantar aspect of the foot involving the soft tissues.     Impression: Linear metallic foreign body within the plantar aspect of  the foot embedded within the soft tissues. Electronically Signed: Sathish Mayo MD  10/15/2024 6:20 PM EDT  Workstation ID: [id number]     ASSESSMENT/PLAN     Diagnoses and all orders for this visit:    1. Foreign body in left foot, initial encounter (Primary)    2. Foot pain, left        Comprehensive lower extremity examination and evaluation was performed.    Discussed findings and treatment plan including risks, benefits, and treatment options with patient in detail. Patient agreed with treatment plan.    Medications and allergies reviewed.  Reviewed available lab values along with other pertinent labs.  These were discussed with the patient.    Patient to keep a Band-Aid on the surgical site daily and continue surgical shoe at all times when ambulating.  The patient states understanding and agreement with this plan.    An After Visit Summary was printed and given to the patient at discharge, including (if requested) any available informative/educational handouts regarding diagnosis, treatment, or medications. All questions were answered to patient/family satisfaction. Should symptoms fail to improve or worsen they agree to call or return to clinic or to go to the Emergency Department. Discussed the importance of following up with any needed screening tests/labs/specialist appointments and any requested follow-up recommended by me today. Importance of maintaining follow-up discussed and patient accepts that missed appointments can delay diagnosis and potentially lead to worsening of conditions.    Return in about 2 weeks (around 11/5/2024) for Post Operative, Suture Removal, No X-ray needed., or sooner if acute issues arise.    This document has been electronically signed by Jorge Kaye DPM on October 22, 2024 14:35 EDT

## 2024-11-01 ENCOUNTER — OFFICE VISIT (OUTPATIENT)
Dept: PODIATRY | Facility: CLINIC | Age: 54
End: 2024-11-01
Payer: COMMERCIAL

## 2024-11-01 VITALS
BODY MASS INDEX: 23.6 KG/M2 | WEIGHT: 125 LBS | OXYGEN SATURATION: 97 % | SYSTOLIC BLOOD PRESSURE: 184 MMHG | TEMPERATURE: 98.4 F | HEART RATE: 96 BPM | DIASTOLIC BLOOD PRESSURE: 107 MMHG | HEIGHT: 61 IN

## 2024-11-01 DIAGNOSIS — S90.852A FOREIGN BODY IN LEFT FOOT, INITIAL ENCOUNTER: Primary | ICD-10-CM

## 2024-11-01 DIAGNOSIS — M79.672 FOOT PAIN, LEFT: ICD-10-CM

## 2024-11-01 PROCEDURE — 99024 POSTOP FOLLOW-UP VISIT: CPT | Performed by: PODIATRIST

## 2024-11-01 NOTE — PROGRESS NOTES
[facility] - PODIATRY    Today's Date: 11/01/24    Patient Name: Vega Correa  MRN: [id number]  CSN: [id number]  PCP: Barrett Bradley MD  Referring Provider: No ref. provider found    SUBJECTIVE     Chief Complaint   Patient presents with    Left Foot - Post-op, Follow-up     Surgery 10/18/24, foreign body removal from left foot     HPI: Vega Correa, a 54 y.o.female, presents to clinic.    Procedure: Foreign body removal from left foot  Date: 18 October 2024    Patient states they are doing well without complications.  Patient states they are following post-op instructions.  Patient states pain is controlled.      Patient healing without complications.    Patient denies any fevers, chills, nausea, vomiting, shortness of breathe, nor any other constitutional signs nor symptoms.      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    Depression     Drug abuse and dependence     SOBER SINCE MARCH 2021    Foreign body in left foot     Headache     Hypertension     HX OF NOT CURRENTLY ON ANY ANTIHYPERTENSIVE    Night sweats     Shortness of breath      Past Surgical History:   Procedure Laterality Date    CHOLECYSTECTOMY      FOREIGN BODY REMOVAL Left 10/18/2024    Procedure: FOREIGN BODY REMOVAL FOOT;  Surgeon: Jorge Kaye DPM;  Location: [facility] MAIN OR;  Service: Podiatry;  Laterality: Left;    TONSILLECTOMY      TUBAL ABDOMINAL LIGATION       Family History   Problem Relation Age of Onset    Depression Mother     Bipolar disorder Mother     Anxiety disorder Mother     Alcohol abuse Father     COPD Father     Depression Sister     No Known Problems Brother     No Known Problems Maternal Aunt     No Known Problems Paternal Aunt     No Known Problems Maternal Uncle     No Known Problems Paternal Uncle     Heart attack Maternal Grandfather     Depression Maternal Grandmother     Bipolar disorder Maternal  Grandmother     No Known Problems Paternal Grandfather     No Known Problems Paternal Grandmother     No Known Problems Cousin     Depression Daughter     Bipolar disorder Daughter     Anxiety disorder Daughter     ADD / ADHD Daughter     ADD / ADHD Nephew     Dementia Neg Hx     Drug abuse Neg Hx     OCD Neg Hx     Paranoid behavior Neg Hx     Schizophrenia Neg Hx     Seizures Neg Hx     Self-Injurious Behavior  Neg Hx     Suicide Attempts Neg Hx      Social History     Socioeconomic History    Marital status:     Number of children: 2    Highest education level: Some college, no degree   Tobacco Use    Smoking status: Every Day     Current packs/day: 0.50     Types: Cigarettes     Passive exposure: Current    Smokeless tobacco: Never    Tobacco comments:     Last smoked last pm   Vaping Use    Vaping status: Every Day    Substances: Nicotine, Flavoring    Devices: Disposable, last used couple days ago   Substance and Sexual Activity    Alcohol use: Not Currently    Drug use: Not Currently     Types: Methamphetamines     Comment: CLEAN SINCE MARCH 2021    Sexual activity: Defer     No Known Allergies  Current Outpatient Medications   Medication Sig Dispense Refill    busPIRone (BUSPAR) 10 MG tablet Take 1 tablet by mouth 3 times a day.      citalopram (CeleXA) 20 MG tablet Take 1 tablet by mouth Daily.      diclofenac (VOLTAREN) 75 MG EC tablet Take 1 tablet by mouth Every 12 (Twelve) Hours.      rOPINIRole (REQUIP) 1 MG tablet Take 1 tablet by mouth Every Night.      cephalexin (KEFLEX) 500 MG capsule Take 1 capsule by mouth 2 (Two) Times a Day. (Patient not taking: Reported on 11/1/2024) 10 capsule 0    cyclobenzaprine (FLEXERIL) 10 MG tablet TAKE 1 TABLET BY MOUTH THREE TIMES DAILY FOR UP TO 5 DAYS AS NEEDED FOR MUSCLE SPASMS (Patient not taking: Reported on 11/1/2024)      HYDROcodone-acetaminophen (NORCO) 7.5-325 MG per tablet Take 1 tablet by mouth Every 6 (Six) Hours As Needed for Moderate Pain.  (Patient not taking: Reported on 11/1/2024) 12 tablet 0    traMADol (ULTRAM) 50 MG tablet Take 1 tablet by mouth Every 8 (Eight) Hours As Needed for Moderate Pain. (Patient not taking: Reported on 11/1/2024) 30 tablet 0     No current facility-administered medications for this visit.     Review of Systems   Constitutional: Negative.    Musculoskeletal:         Follow-up for removal of foreign body in left lateral midfoot.   All other systems reviewed and are negative.      OBJECTIVE     Vitals:    11/01/24 0927   BP: (!) 184/107   Pulse:    Temp:    SpO2:        WBC   Date Value Ref Range Status   03/01/2023 7.12 3.40 - 10.80 10*3/mm3 Final     RBC   Date Value Ref Range Status   03/01/2023 4.60 3.77 - 5.28 10*6/mm3 Final     Hemoglobin   Date Value Ref Range Status   03/01/2023 13.7 12.0 - 15.9 g/dL Final     Hematocrit   Date Value Ref Range Status   03/01/2023 40.1 34.0 - 46.6 % Final     MCV   Date Value Ref Range Status   03/01/2023 87.2 79.0 - 97.0 fL Final     MCH   Date Value Ref Range Status   03/01/2023 29.8 26.6 - 33.0 pg Final     MCHC   Date Value Ref Range Status   03/01/2023 34.2 31.5 - 35.7 g/dL Final     RDW   Date Value Ref Range Status   03/01/2023 12.3 12.3 - 15.4 % Final     RDW-SD   Date Value Ref Range Status   03/01/2023 39.3 37.0 - 54.0 fl Final     MPV   Date Value Ref Range Status   03/01/2023 9.8 6.0 - 12.0 fL Final     Platelets   Date Value Ref Range Status   03/01/2023 289 140 - 450 10*3/mm3 Final     Neutrophil %   Date Value Ref Range Status   03/01/2023 57.8 42.7 - 76.0 % Final     Lymphocyte %   Date Value Ref Range Status   03/01/2023 27.4 19.6 - 45.3 % Final     Monocyte %   Date Value Ref Range Status   03/01/2023 9.0 5.0 - 12.0 % Final     Eosinophil %   Date Value Ref Range Status   03/01/2023 4.8 0.3 - 6.2 % Final     Basophil %   Date Value Ref Range Status   03/01/2023 0.7 0.0 - 1.5 % Final     Immature Grans %   Date Value Ref Range Status   03/01/2023 0.3 0.0 - 0.5 %  Final     Neutrophils, Absolute   Date Value Ref Range Status   03/01/2023 4.12 1.70 - 7.00 10*3/mm3 Final     Lymphocytes, Absolute   Date Value Ref Range Status   03/01/2023 1.95 0.70 - 3.10 10*3/mm3 Final     Monocytes, Absolute   Date Value Ref Range Status   03/01/2023 0.64 0.10 - 0.90 10*3/mm3 Final     Eosinophils, Absolute   Date Value Ref Range Status   03/01/2023 0.34 0.00 - 0.40 10*3/mm3 Final     Basophils, Absolute   Date Value Ref Range Status   03/01/2023 0.05 0.00 - 0.20 10*3/mm3 Final     Immature Grans, Absolute   Date Value Ref Range Status   03/01/2023 0.02 0.00 - 0.05 10*3/mm3 Final     nRBC   Date Value Ref Range Status   03/01/2023 0.0 0.0 - 0.2 /100 WBC Final         Lab Results   Component Value Date    GLUCOSE 88 03/01/2023    BUN 20 03/01/2023    CREATININE 0.96 03/01/2023     03/01/2023    K 4.8 03/01/2023    CL 98 03/01/2023    CALCIUM 10.2 03/01/2023    PROTEINTOT 7.9 03/01/2023    ALBUMIN 4.7 03/01/2023     (H) 03/01/2023    AST 69 (H) 03/01/2023    ALKPHOS 77 03/01/2023    BILITOT 0.3 03/01/2023    GLOB 3.2 03/01/2023    AGRATIO 1.5 03/01/2023    BCR 20.8 03/01/2023    ANIONGAP 9.8 03/01/2023    EGFR 72.2 03/01/2023       Patient seen in no apparent distress.      PHYSICAL EXAM:     Foot/Ankle Exam    GENERAL  Appearance:  appears stated age  Orientation:  AAOx3  Affect:  appropriate  Gait:  unimpaired  Assistance:  independent  Right shoe gear: sandal  Left shoe gear: sandal    VASCULAR     Right Foot Vascularity   Normal vascular exam    Dorsalis pedis:  2+  Posterior tibial:  2+  Skin temperature:  warm  Edema grading:  None  CFT:  < 3 seconds  Pedal hair growth:  Present  Varicosities:  none     Left Foot Vascularity   Normal vascular exam    Dorsalis pedis:  2+  Posterior tibial:  2+  Skin temperature:  warm  Edema grading:  None  CFT:  < 3 seconds  Pedal hair growth:  Present  Varicosities:  none     NEUROLOGIC     Right Foot Neurologic   Normal sensation    Light  touch sensation: normal  Vibratory sensation: normal  Hot/Cold sensation: normal  Protective Sensation using [place] Monofilament:   Sites intact: 10  Sites tested: 10     Left Foot Neurologic   Normal sensation    Light touch sensation: normal  Vibratory sensation: normal  Hot/Cold sensation:  normal  Protective Sensation using [place] Monofilament:   Sites intact: 10  Sites tested: 10    MUSCULOSKELETAL     Left Foot Musculoskeletal   Tenderness:  (Left arch)    MUSCLE STRENGTH     Right Foot Muscle Strength   Foot dorsiflexion:  4  Foot plantar flexion:  4  Foot inversion:  4  Foot eversion:  4     Left Foot Muscle Strength   Foot dorsiflexion:  4  Foot plantar flexion:  4  Foot inversion:  4  Foot eversion:  4    RANGE OF MOTION     Right Foot Range of Motion   Foot and ankle ROM within normal limits       Left Foot Range of Motion   Foot and ankle ROM within normal limits      DERMATOLOGIC      Right Foot Dermatologic   Skin  Right foot skin is intact.      Left Foot Dermatologic   Skin  Left foot skin is intact.      Left foot additional comments: Left foot shows dressing is dry and intact without signs of breakthrough.  Lateral aspect of left foot shows sutures intact with skin edges well-coapted with no signs of dehiscence.  Healthy surgical skin edges.  No drainage present.  No edema, erythema, calor, lymphangitis, nor signs of infection seen.    Upon removal of sutures, skin edges remain well-coapted with no signs of dehiscence.    ASSESSMENT/PLAN     Diagnoses and all orders for this visit:    1. Foreign body in left foot, initial encounter (Primary)    2. Foot pain, left    Comprehensive lower extremity examination and evaluation was performed.    Discussed findings and treatment plan including risks, benefits, and treatment options with patient in detail. Patient agreed with treatment plan.    Medications and allergies reviewed.  Reviewed available lab values along with other pertinent  labs.  These were discussed with the patient.    Patient to keep a Band-Aid on the surgical site.    Patient may begin to weight bear as tolerated in supportive shoes.  No impact activities for two weeks.  After that time, the patient may increase activities as tolerated. Patient states understanding and agreement with this plan.    An After Visit Summary was printed and given to the patient at discharge, including (if requested) any available informative/educational handouts regarding diagnosis, treatment, or medications. All questions were answered to patient/family satisfaction. Should symptoms fail to improve or worsen they agree to call or return to clinic or to go to the Emergency Department. Discussed the importance of following up with any needed screening tests/labs/specialist appointments and any requested follow-up recommended by me today. Importance of maintaining follow-up discussed and patient accepts that missed appointments can delay diagnosis and potentially lead to worsening of conditions.    Return if symptoms worsen or fail to improve., or sooner if acute issues arise.    This document has been electronically signed by Jorge Kaye DPM on November 1, 2024 09:46 EDT

## 2025-06-03 ENCOUNTER — LAB (OUTPATIENT)
Dept: LAB | Facility: HOSPITAL | Age: 55
End: 2025-06-03
Payer: COMMERCIAL

## 2025-06-03 ENCOUNTER — OFFICE VISIT (OUTPATIENT)
Dept: FAMILY MEDICINE CLINIC | Facility: CLINIC | Age: 55
End: 2025-06-03
Payer: COMMERCIAL

## 2025-06-03 VITALS
TEMPERATURE: 97.5 F | DIASTOLIC BLOOD PRESSURE: 80 MMHG | HEIGHT: 61 IN | RESPIRATION RATE: 17 BRPM | BODY MASS INDEX: 24.7 KG/M2 | WEIGHT: 130.8 LBS | HEART RATE: 94 BPM | OXYGEN SATURATION: 98 % | SYSTOLIC BLOOD PRESSURE: 120 MMHG

## 2025-06-03 DIAGNOSIS — Z13.1 SCREENING FOR DIABETES MELLITUS: ICD-10-CM

## 2025-06-03 DIAGNOSIS — G25.81 RLS (RESTLESS LEGS SYNDROME): ICD-10-CM

## 2025-06-03 DIAGNOSIS — Z13.220 SCREENING FOR LIPID DISORDERS: ICD-10-CM

## 2025-06-03 DIAGNOSIS — Z13.29 SCREENING FOR THYROID DISORDER: ICD-10-CM

## 2025-06-03 DIAGNOSIS — M19.90 ARTHRITIS: ICD-10-CM

## 2025-06-03 DIAGNOSIS — G47.09 OTHER INSOMNIA: ICD-10-CM

## 2025-06-03 DIAGNOSIS — I10 ESSENTIAL HYPERTENSION: ICD-10-CM

## 2025-06-03 DIAGNOSIS — Z00.00 ANNUAL PHYSICAL EXAM: Primary | ICD-10-CM

## 2025-06-03 LAB
ALBUMIN SERPL-MCNC: 4.3 G/DL (ref 3.5–5.2)
ALBUMIN/GLOB SERPL: 1.4 G/DL
ALP SERPL-CCNC: 67 U/L (ref 39–117)
ALT SERPL W P-5'-P-CCNC: 11 U/L (ref 1–33)
ANION GAP SERPL CALCULATED.3IONS-SCNC: 8.7 MMOL/L (ref 5–15)
AST SERPL-CCNC: 21 U/L (ref 1–32)
BASOPHILS # BLD AUTO: 0.02 10*3/MM3 (ref 0–0.2)
BASOPHILS NFR BLD AUTO: 0.3 % (ref 0–1.5)
BILIRUB SERPL-MCNC: 0.2 MG/DL (ref 0–1.2)
BUN SERPL-MCNC: 11 MG/DL (ref 6–20)
BUN/CREAT SERPL: 15.5 (ref 7–25)
CALCIUM SPEC-SCNC: 9.8 MG/DL (ref 8.6–10.5)
CHLORIDE SERPL-SCNC: 102 MMOL/L (ref 98–107)
CHOLEST SERPL-MCNC: 193 MG/DL (ref 0–200)
CO2 SERPL-SCNC: 28.3 MMOL/L (ref 22–29)
CREAT SERPL-MCNC: 0.71 MG/DL (ref 0.57–1)
DEPRECATED RDW RBC AUTO: 43.1 FL (ref 37–54)
EGFRCR SERPLBLD CKD-EPI 2021: 101.8 ML/MIN/1.73
EOSINOPHIL # BLD AUTO: 0.11 10*3/MM3 (ref 0–0.4)
EOSINOPHIL NFR BLD AUTO: 1.8 % (ref 0.3–6.2)
ERYTHROCYTE [DISTWIDTH] IN BLOOD BY AUTOMATED COUNT: 12.3 % (ref 12.3–15.4)
GLOBULIN UR ELPH-MCNC: 3.1 GM/DL
GLUCOSE SERPL-MCNC: 74 MG/DL (ref 65–99)
HBA1C MFR BLD: 5.4 % (ref 4.8–5.6)
HCT VFR BLD AUTO: 41.6 % (ref 34–46.6)
HDLC SERPL-MCNC: 45 MG/DL (ref 40–60)
HGB BLD-MCNC: 13.4 G/DL (ref 12–15.9)
IMM GRANULOCYTES # BLD AUTO: 0.02 10*3/MM3 (ref 0–0.05)
IMM GRANULOCYTES NFR BLD AUTO: 0.3 % (ref 0–0.5)
LDLC SERPL CALC-MCNC: 117 MG/DL (ref 0–100)
LDLC/HDLC SERPL: 2.52 {RATIO}
LYMPHOCYTES # BLD AUTO: 2.03 10*3/MM3 (ref 0.7–3.1)
LYMPHOCYTES NFR BLD AUTO: 32.5 % (ref 19.6–45.3)
MCH RBC QN AUTO: 30.7 PG (ref 26.6–33)
MCHC RBC AUTO-ENTMCNC: 32.2 G/DL (ref 31.5–35.7)
MCV RBC AUTO: 95.2 FL (ref 79–97)
MONOCYTES # BLD AUTO: 0.56 10*3/MM3 (ref 0.1–0.9)
MONOCYTES NFR BLD AUTO: 9 % (ref 5–12)
NEUTROPHILS NFR BLD AUTO: 3.5 10*3/MM3 (ref 1.7–7)
NEUTROPHILS NFR BLD AUTO: 56.1 % (ref 42.7–76)
NRBC BLD AUTO-RTO: 0 /100 WBC (ref 0–0.2)
PLATELET # BLD AUTO: 356 10*3/MM3 (ref 140–450)
PMV BLD AUTO: 9.7 FL (ref 6–12)
POTASSIUM SERPL-SCNC: 4 MMOL/L (ref 3.5–5.2)
PROT SERPL-MCNC: 7.4 G/DL (ref 6–8.5)
RBC # BLD AUTO: 4.37 10*6/MM3 (ref 3.77–5.28)
SODIUM SERPL-SCNC: 139 MMOL/L (ref 136–145)
TRIGL SERPL-MCNC: 174 MG/DL (ref 0–150)
TSH SERPL DL<=0.05 MIU/L-ACNC: 1.57 UIU/ML (ref 0.27–4.2)
VLDLC SERPL-MCNC: 31 MG/DL (ref 5–40)
WBC NRBC COR # BLD AUTO: 6.24 10*3/MM3 (ref 3.4–10.8)

## 2025-06-03 PROCEDURE — 83036 HEMOGLOBIN GLYCOSYLATED A1C: CPT

## 2025-06-03 PROCEDURE — 85025 COMPLETE CBC W/AUTO DIFF WBC: CPT

## 2025-06-03 PROCEDURE — 80061 LIPID PANEL: CPT

## 2025-06-03 PROCEDURE — 80053 COMPREHEN METABOLIC PANEL: CPT

## 2025-06-03 PROCEDURE — 36415 COLL VENOUS BLD VENIPUNCTURE: CPT

## 2025-06-03 PROCEDURE — 84443 ASSAY THYROID STIM HORMONE: CPT

## 2025-06-03 RX ORDER — DICLOFENAC SODIUM 75 MG/1
75 TABLET, DELAYED RELEASE ORAL EVERY 12 HOURS SCHEDULED
Qty: 60 TABLET | Refills: 3 | Status: SHIPPED | OUTPATIENT
Start: 2025-06-03

## 2025-06-03 RX ORDER — ROPINIROLE 1 MG/1
1 TABLET, FILM COATED ORAL NIGHTLY
Qty: 30 TABLET | Refills: 3 | Status: SHIPPED | OUTPATIENT
Start: 2025-06-03

## 2025-06-03 RX ORDER — LISINOPRIL 10 MG/1
10 TABLET ORAL DAILY
Qty: 30 TABLET | Refills: 3 | Status: SHIPPED | OUTPATIENT
Start: 2025-06-03

## 2025-06-03 RX ORDER — LISINOPRIL 10 MG/1
TABLET ORAL
COMMUNITY
Start: 2025-05-15 | End: 2025-06-03 | Stop reason: SDUPTHER

## 2025-06-03 RX ORDER — DOXEPIN HYDROCHLORIDE 10 MG/1
10 CAPSULE ORAL NIGHTLY
Qty: 30 CAPSULE | Refills: 3 | Status: SHIPPED | OUTPATIENT
Start: 2025-06-03

## 2025-06-03 NOTE — PROGRESS NOTES
Chief Complaint     Establish Care and Med Refill    History of Present Illness     Vega Correa is a 55 y.o. female who presents to [facility] for evaluation of .          History of Present Illness  The patient is a 55-year-old female who presents to the office for reestablishing care and medication refills.    She has been on a regimen of fluoxetine 20 mg daily, which she reports as beneficial. Additionally, she was previously prescribed BuSpar during her time at [email or web address], but the exact dosage remains unclear to her. She has an upcoming appointment with [facility] next week.    She is currently on lisinopril for blood pressure management.    For her restless legs syndrome, she has been taking ropinirole for several years.    She is seeking a refill of her diclofenac prescription for arthritis management.    She has been experiencing excessive thirst and is concerned about potential diabetes. She requests a blood sugar test.    She was previously on a sleep aid that induced dreams, but this was discontinued and replaced with a newer medication that promotes sleep onset. She has tried hydroxyzine in the past, but it resulted in grogginess. Trazodone was also trialed, but it led to vivid dreams.    She reports experiencing mood swings characterized by emotional outbursts and crying, which she attributes to her anxiety. Her mother has suggested that she seek treatment for her nerves.          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    Depression     Drug abuse and dependence     SOBER SINCE MARCH 2021    Foreign body in left foot     Headache     Hypertension     HX OF NOT CURRENTLY ON ANY ANTIHYPERTENSIVE    Night sweats     Shortness of breath        Past Surgical History:   Procedure Laterality Date    CHOLECYSTECTOMY      FOREIGN BODY REMOVAL Left 10/18/2024    Procedure:  FOREIGN BODY REMOVAL FOOT;  Surgeon: Jorge Kaye DPM;  Location: [facility] MAIN OR;  Service: Podiatry;  Laterality: Left;    TONSILLECTOMY      TUBAL ABDOMINAL LIGATION         Family History   Problem Relation Age of Onset    Depression Mother     Bipolar disorder Mother     Anxiety disorder Mother     Alcohol abuse Father     COPD Father     Depression Sister     No Known Problems Brother     No Known Problems Maternal Aunt     No Known Problems Paternal Aunt     No Known Problems Maternal Uncle     No Known Problems Paternal Uncle     Heart attack Maternal Grandfather     Depression Maternal Grandmother     Bipolar disorder Maternal Grandmother     No Known Problems Paternal Grandfather     No Known Problems Paternal Grandmother     No Known Problems Cousin     Depression Daughter     Bipolar disorder Daughter     Anxiety disorder Daughter     ADD / ADHD Daughter     ADD / ADHD Nephew     Dementia Neg Hx     Drug abuse Neg Hx     OCD Neg Hx     Paranoid behavior Neg Hx     Schizophrenia Neg Hx     Seizures Neg Hx     Self-Injurious Behavior  Neg Hx     Suicide Attempts Neg Hx         Current Medications        Current Outpatient Medications:     diclofenac (VOLTAREN) 75 MG EC tablet, Take 1 tablet by mouth Every 12 (Twelve) Hours., Disp: 60 tablet, Rfl: 3    FLUoxetine (PROzac) 20 MG capsule, Take 1 capsule by mouth Daily., Disp: 30 capsule, Rfl: 3    lisinopril (PRINIVIL,ZESTRIL) 10 MG tablet, Take 1 tablet by mouth Daily., Disp: 30 tablet, Rfl: 3    rOPINIRole (REQUIP) 1 MG tablet, Take 1 tablet by mouth Every Night., Disp: 30 tablet, Rfl: 3    busPIRone (BUSPAR) 10 MG tablet, Take 1 tablet by mouth 3 times a day. (Patient not taking: Reported on 6/3/2025), Disp: , Rfl:     doxepin (SINEquan) 10 MG capsule, Take 1 capsule by mouth Every Night., Disp: 30 capsule, Rfl: 3     Allergies     No Known Allergies    Social History       Social History     Social History Narrative    Not on file  "      Immunizations     Immunization:  Immunization History   Administered Date(s) Administered    COVID-19 (MODERNA) 1st,2nd,3rd Dose Monovalent 03/06/2021, 04/10/2021    Tdap 10/15/2024          Objective     Objective     Vital Signs:   /80   Pulse 94   Temp 97.5 °F (36.4 °C) (Temporal)   Resp 17   Ht 154.9 cm (60.98\")   Wt 59.3 kg (130 lb 12.8 oz)   SpO2 98%   BMI 24.73 kg/m²       Physical Exam  Vitals reviewed.   Constitutional:       General: She is not in acute distress.  HENT:      Head: Normocephalic.      Right Ear: Tympanic membrane normal.      Left Ear: Tympanic membrane normal.      Nose: Nose normal.      Mouth/Throat:      Pharynx: Oropharynx is clear. No posterior oropharyngeal erythema.   Eyes:      General: No scleral icterus.     Extraocular Movements: Extraocular movements intact.      Conjunctiva/sclera: Conjunctivae normal.      Pupils: Pupils are equal, round, and reactive to light.   Cardiovascular:      Rate and Rhythm: Normal rate and regular rhythm.      Pulses: Normal pulses.      Heart sounds: Normal heart sounds.   Pulmonary:      Effort: Pulmonary effort is normal.      Breath sounds: Normal breath sounds.   Abdominal:      General: Bowel sounds are normal.      Palpations: Abdomen is soft.   Musculoskeletal:         General: Normal range of motion.      Cervical back: Neck supple.   Skin:     General: Skin is warm and dry.   Neurological:      Mental Status: She is alert and oriented to person, place, and time.   Psychiatric:         Attention and Perception: Attention and perception normal.         Mood and Affect: Mood is anxious.         Behavior: Behavior normal. Behavior is cooperative.         Thought Content: Thought content normal.         Cognition and Memory: Cognition normal.         Judgment: Judgment normal.         Physical Exam        Results      Result Review :   The following data was reviewed by: YOEL Caceres on 06/03/2025:  Common " labs          6/3/2025    16:05   Common Labs   Glucose 74    BUN 11.0    Creatinine 0.71    Sodium 139    Potassium 4.0    Chloride 102    Calcium 9.8    Albumin 4.3    Total Bilirubin 0.2    Alkaline Phosphatase 67    AST (SGOT) 21    ALT (SGPT) 11    WBC 6.24    Hemoglobin 13.4    Hematocrit 41.6    Platelets 356    Total Cholesterol 193    Triglycerides 174    HDL Cholesterol 45    LDL Cholesterol  117    Hemoglobin A1C 5.40      Consultant notes podiatry     Results           Assessment and Plan        Assessment and Plan    Diagnoses and all orders for this visit:    1. Annual physical exam (Primary)  Assessment & Plan:  Discussed age-appropriate preventative counseling including all recommended screenings and immunizations, dental health, daily sunscreen, and seatbelt use.. Discussed issues common to age group and preventive medicine services, as well as any needed anticipatory guidance. Written information provided to patient. All questions answered. Pt verbalized understanding.         2. Screening for thyroid disorder  -     TSH; Future    3. Screening for lipid disorders  -     Comprehensive Metabolic Panel; Future  -     CBC & Differential; Future  -     Lipid Panel; Future    4. Screening for diabetes mellitus  -     Hemoglobin A1c; Future    5. Essential hypertension  Assessment & Plan:  - Blood pressure is well-controlled on lisinopril.  - Blood pressure readings are within the desired range.  - Prescription for lisinopril will be refilled.      6. RLS (restless legs syndrome)  Assessment & Plan:   Has been on ropinirole for years.  - Reports continued effectiveness of ropinirole for managing symptoms.  - Prescription for ropinirole will be refilled.      7. Arthritis  Assessment & Plan:  - Currently taking diclofenac for arthritis pain.  - Reports effectiveness of diclofenac in managing arthritis symptoms.  - Prescription for diclofenac will be refilled.      8. Other insomnia  Assessment & Plan:  -  Reports difficulty sleeping and previous issues with medications causing dreams or grogginess.  - Doxepin will be prescribed to be taken 30 minutes before bedtime.  - If doxepin proves ineffective, alternative treatments will be considered.  Discussed all med SE/AEs including all BB warnings, if these occur, pt will DC medication immediately, notify office, and proceed to ED.         Other orders  -     FLUoxetine (PROzac) 20 MG capsule; Take 1 capsule by mouth Daily.  Dispense: 30 capsule; Refill: 3  -     lisinopril (PRINIVIL,ZESTRIL) 10 MG tablet; Take 1 tablet by mouth Daily.  Dispense: 30 tablet; Refill: 3  -     rOPINIRole (REQUIP) 1 MG tablet; Take 1 tablet by mouth Every Night.  Dispense: 30 tablet; Refill: 3  -     diclofenac (VOLTAREN) 75 MG EC tablet; Take 1 tablet by mouth Every 12 (Twelve) Hours.  Dispense: 60 tablet; Refill: 3  -     doxepin (SINEquan) 10 MG capsule; Take 1 capsule by mouth Every Night.  Dispense: 30 capsule; Refill: 3        Assessment & Plan  1. Anxiety.    2. Hypertension.    3. Restless legs syndrome.  -  4. Arthritis.    5. Suspected diabetes.  - Reports increased thirst, which may be a sign of diabetes.  - Blood sugar and A1c tests will be conducted to rule out diabetes.  - Comprehensive laboratory tests will be ordered to assess blood sugar and hemoglobin A1c levels.    6. Sleep disturbances.    7. Health maintenance.  - Comprehensive laboratory tests will be ordered to assess kidney function, liver function, thyroid levels, cholesterol levels, blood sugar, and hemoglobin A1c.  - Labs will include standard tests to ensure overall health status is monitored.        Follow Up        Follow Up   Return in about 4 weeks (around 7/1/2025), or if symptoms worsen or fail to improve.  Patient was given instructions and counseling regarding her condition or for health maintenance advice. Please see specific information pulled into the AVS if appropriate.      Patient or patient  representative verbalized consent for the use of Ambient Listening during the visit with  YOEL Caceres for chart documentation. 6/4/2025  10:06 EDT

## 2025-06-03 NOTE — ASSESSMENT & PLAN NOTE
- Reports experiencing mood swings and crying spells, which may be related to anxiety.  - Currently on fluoxetine 20 mg daily and BuSpar.  - Advised to continue with her scheduled appointment next week at [facility] for further evaluation and management.  - Prescription for fluoxetine 20 mg daily will be refilled. Samples of Vraylar will be provided if available.

## 2025-06-03 NOTE — ASSESSMENT & PLAN NOTE
Has been on ropinirole for years.  - Reports continued effectiveness of ropinirole for managing symptoms.  - Prescription for ropinirole will be refilled.

## 2025-06-03 NOTE — ASSESSMENT & PLAN NOTE
- Blood pressure is well-controlled on lisinopril.  - Blood pressure readings are within the desired range.  - Prescription for lisinopril will be refilled.

## 2025-06-04 ENCOUNTER — PATIENT ROUNDING (BHMG ONLY) (OUTPATIENT)
Dept: FAMILY MEDICINE CLINIC | Facility: CLINIC | Age: 55
End: 2025-06-04
Payer: COMMERCIAL

## 2025-06-04 PROBLEM — G47.09 OTHER INSOMNIA: Status: ACTIVE | Noted: 2025-06-04

## 2025-06-04 PROBLEM — Z00.00 ANNUAL PHYSICAL EXAM: Status: ACTIVE | Noted: 2025-06-04

## 2025-06-04 NOTE — ASSESSMENT & PLAN NOTE
- Reports difficulty sleeping and previous issues with medications causing dreams or grogginess.  - Doxepin will be prescribed to be taken 30 minutes before bedtime.  - If doxepin proves ineffective, alternative treatments will be considered.  Discussed all med SE/AEs including all BB warnings, if these occur, pt will DC medication immediately, notify office, and proceed to ED.

## 2025-06-04 NOTE — ASSESSMENT & PLAN NOTE
- Currently taking diclofenac for arthritis pain.  - Reports effectiveness of diclofenac in managing arthritis symptoms.  - Prescription for diclofenac will be refilled.

## 2025-06-05 ENCOUNTER — TELEPHONE (OUTPATIENT)
Dept: FAMILY MEDICINE CLINIC | Facility: CLINIC | Age: 55
End: 2025-06-05
Payer: COMMERCIAL

## 2025-06-05 NOTE — TELEPHONE ENCOUNTER
Caller: Vega Correa    Relationship: Self    Best call back number: [phone]      What test was performed: LABS     When was the test performed: 06/03/2025    Where was the test performed: IN OFFICE     Additional notes: PATIENT CANNOT ACCESS HER MY CHART AT THIS TIME AND NEEDS SOMEONE TO CALL HER AND GO OVER THE RESULTS

## 2025-06-10 ENCOUNTER — TELEPHONE (OUTPATIENT)
Dept: FAMILY MEDICINE CLINIC | Facility: CLINIC | Age: 55
End: 2025-06-10
Payer: COMMERCIAL

## 2025-06-10 NOTE — TELEPHONE ENCOUNTER
Caller: Harvey Correaflaco    Relationship: Self    Best call back number: [phone]    What medication are you requesting: SOMETHING FOR ANXIETY    What are your current symptoms: N/A    How long have you been experiencing symptoms: N/A    Have you had these symptoms before:    [] Yes  [] No    Have you been treated for these symptoms before:   [] Yes  [] No    If a prescription is needed, what is your preferred pharmacy and phone number: [address] [phone] [facility] [phone]      Additional notes:PATIENT CANNOT TAKE THE VRAYLAR. THIS MAKES HER ANXIETY SO MUCH WORSE SHE CANNOT BE STILL. PLEASE SEND NEW PRESCRIPTION TO PHARMACY ASAP.

## (undated) DEVICE — INTENDED FOR TISSUE SEPARATION, AND OTHER PROCEDURES THAT REQUIRE A SHARP SURGICAL BLADE TO PUNCTURE OR CUT.: Brand: BARD-PARKER ® CARBON RIB-BACK BLADES

## (undated) DEVICE — GLV SURG SENSICARE PI ORTHO SZ9 LF STRL

## (undated) DEVICE — GOWN,REINF,POLY,SIRUS,BRTH SLV,XLNG/XXL: Brand: MEDLINE

## (undated) DEVICE — BNDG ESMARK STRL 6INX12FT LF

## (undated) DEVICE — SOL IRR NACL 0.9PCT BT 1000ML

## (undated) DEVICE — APPL DURAPREP IODOPHOR APL 26ML

## (undated) DEVICE — DRSNG WND GZ CURAD OIL EMULSION 3X3IN STRL

## (undated) DEVICE — STANDARD HYPODERMIC NEEDLE,POLYPROPYLENE HUB: Brand: MONOJECT

## (undated) DEVICE — DRSNG PAD ABD 8X10IN STRL

## (undated) DEVICE — BNDG ELAS ECON W/CLIP 4IN 5YD LF STRL

## (undated) DEVICE — BNDG ELAS CO-FLEX SLF ADHR 6IN 5YD LF STRL

## (undated) DEVICE — SUT ETHLN 2/0 FSLX 30IN 1674H

## (undated) DEVICE — PENCL SMOKE/EVAC MEGADYNE TELESCP 10FT

## (undated) DEVICE — BNDG ESMARK 4IN 12FT LF STRL BLU

## (undated) DEVICE — PROXIMATE RH ROTATING HEAD SKIN STAPLERS (35 WIDE) CONTAINS 35 STAINLESS STEEL STAPLES: Brand: PROXIMATE

## (undated) DEVICE — APPL CHLORAPREP HI/LITE 26ML ORNG

## (undated) DEVICE — BLANKT WARM PACU MISTRAL/AIR PREM REFL A/ 85.8X50IN

## (undated) DEVICE — BANDAGE,GAUZE,BULKEE II,4.5"X4.1YD,STRL: Brand: MEDLINE

## (undated) DEVICE — SYR LL TP 10ML STRL

## (undated) DEVICE — DRSNG WND GZ CURAD OIL EMULSION 3X8IN LF STRL 1PK

## (undated) DEVICE — VAGINAL PREP TRAY: Brand: MEDLINE INDUSTRIES, INC.

## (undated) DEVICE — EXTREMITY-LF: Brand: MEDLINE INDUSTRIES, INC.